# Patient Record
Sex: FEMALE | Race: WHITE | HISPANIC OR LATINO | Employment: FULL TIME | URBAN - METROPOLITAN AREA
[De-identification: names, ages, dates, MRNs, and addresses within clinical notes are randomized per-mention and may not be internally consistent; named-entity substitution may affect disease eponyms.]

---

## 2018-03-12 ENCOUNTER — HOSPITAL ENCOUNTER (INPATIENT)
Facility: MEDICAL CENTER | Age: 28
LOS: 8 days | DRG: 853 | End: 2018-03-20
Attending: EMERGENCY MEDICINE | Admitting: HOSPITALIST
Payer: COMMERCIAL

## 2018-03-12 ENCOUNTER — APPOINTMENT (OUTPATIENT)
Dept: RADIOLOGY | Facility: MEDICAL CENTER | Age: 28
DRG: 853 | End: 2018-03-12
Attending: INTERNAL MEDICINE
Payer: COMMERCIAL

## 2018-03-12 ENCOUNTER — HOSPITAL ENCOUNTER (OUTPATIENT)
Dept: RADIOLOGY | Facility: MEDICAL CENTER | Age: 28
End: 2018-03-12

## 2018-03-12 ENCOUNTER — RESOLUTE PROFESSIONAL BILLING HOSPITAL PROF FEE (OUTPATIENT)
Dept: HOSPITALIST | Facility: MEDICAL CENTER | Age: 28
End: 2018-03-12
Payer: COMMERCIAL

## 2018-03-12 ENCOUNTER — APPOINTMENT (OUTPATIENT)
Dept: RADIOLOGY | Facility: MEDICAL CENTER | Age: 28
DRG: 853 | End: 2018-03-12
Attending: HOSPITALIST
Payer: COMMERCIAL

## 2018-03-12 DIAGNOSIS — K85.00 IDIOPATHIC ACUTE PANCREATITIS WITHOUT INFECTION OR NECROSIS: ICD-10-CM

## 2018-03-12 DIAGNOSIS — K85.10 GALLSTONE PANCREATITIS: Primary | ICD-10-CM

## 2018-03-12 DIAGNOSIS — G89.18 POSTOPERATIVE PAIN: ICD-10-CM

## 2018-03-12 PROBLEM — R10.11 RUQ PAIN: Status: RESOLVED | Noted: 2018-03-12 | Resolved: 2018-03-12

## 2018-03-12 PROBLEM — R10.11 RUQ PAIN: Status: ACTIVE | Noted: 2018-03-12

## 2018-03-12 PROBLEM — A41.9 SEPSIS (HCC): Status: ACTIVE | Noted: 2018-03-12

## 2018-03-12 PROBLEM — K81.9 CHOLECYSTITIS: Status: ACTIVE | Noted: 2018-03-12

## 2018-03-12 PROBLEM — K80.50 CHOLEDOCHOLITHIASIS: Status: ACTIVE | Noted: 2018-03-12

## 2018-03-12 LAB
ALBUMIN SERPL BCP-MCNC: 3.7 G/DL (ref 3.2–4.9)
ALBUMIN/GLOB SERPL: 1.8 G/DL
ALP SERPL-CCNC: 458 U/L (ref 30–99)
ALT SERPL-CCNC: 642 U/L (ref 2–50)
ANION GAP SERPL CALC-SCNC: 9 MMOL/L (ref 0–11.9)
APPEARANCE UR: CLEAR
APTT PPP: 29.6 SEC (ref 24.7–36)
AST SERPL-CCNC: 225 U/L (ref 12–45)
BASOPHILS # BLD AUTO: 0.3 % (ref 0–1.8)
BASOPHILS # BLD: 0.04 K/UL (ref 0–0.12)
BILIRUB SERPL-MCNC: 1.7 MG/DL (ref 0.1–1.5)
BILIRUB UR QL STRIP.AUTO: NEGATIVE
BUN SERPL-MCNC: 11 MG/DL (ref 8–22)
CALCIUM SERPL-MCNC: 7.9 MG/DL (ref 8.5–10.5)
CHLORIDE SERPL-SCNC: 107 MMOL/L (ref 96–112)
CO2 SERPL-SCNC: 23 MMOL/L (ref 20–33)
COLOR UR: ABNORMAL
CREAT SERPL-MCNC: 0.46 MG/DL (ref 0.5–1.4)
EOSINOPHIL # BLD AUTO: 0 K/UL (ref 0–0.51)
EOSINOPHIL NFR BLD: 0 % (ref 0–6.9)
ERYTHROCYTE [DISTWIDTH] IN BLOOD BY AUTOMATED COUNT: 46.8 FL (ref 35.9–50)
GLOBULIN SER CALC-MCNC: 2.1 G/DL (ref 1.9–3.5)
GLUCOSE SERPL-MCNC: 126 MG/DL (ref 65–99)
GLUCOSE UR STRIP.AUTO-MCNC: 100 MG/DL
HCT VFR BLD AUTO: 39 % (ref 37–47)
HGB BLD-MCNC: 12.7 G/DL (ref 12–16)
IMM GRANULOCYTES # BLD AUTO: 0.07 K/UL (ref 0–0.11)
IMM GRANULOCYTES NFR BLD AUTO: 0.5 % (ref 0–0.9)
INR PPP: 1.04 (ref 0.87–1.13)
KETONES UR STRIP.AUTO-MCNC: 80 MG/DL
LACTATE BLD-SCNC: 1.4 MMOL/L (ref 0.5–2)
LEUKOCYTE ESTERASE UR QL STRIP.AUTO: NEGATIVE
LIPASE SERPL-CCNC: >6000 U/L (ref 11–82)
LYMPHOCYTES # BLD AUTO: 0.71 K/UL (ref 1–4.8)
LYMPHOCYTES NFR BLD: 5.6 % (ref 22–41)
MCH RBC QN AUTO: 30.4 PG (ref 27–33)
MCHC RBC AUTO-ENTMCNC: 32.6 G/DL (ref 33.6–35)
MCV RBC AUTO: 93.3 FL (ref 81.4–97.8)
MICRO URNS: ABNORMAL
MONOCYTES # BLD AUTO: 0.44 K/UL (ref 0–0.85)
MONOCYTES NFR BLD AUTO: 3.4 % (ref 0–13.4)
NEUTROPHILS # BLD AUTO: 11.51 K/UL (ref 2–7.15)
NEUTROPHILS NFR BLD: 90.2 % (ref 44–72)
NITRITE UR QL STRIP.AUTO: NEGATIVE
NRBC # BLD AUTO: 0 K/UL
NRBC BLD-RTO: 0 /100 WBC
PH UR STRIP.AUTO: 5.5 [PH]
PLATELET # BLD AUTO: 320 K/UL (ref 164–446)
PMV BLD AUTO: 11.5 FL (ref 9–12.9)
POTASSIUM SERPL-SCNC: 3.6 MMOL/L (ref 3.6–5.5)
PROT SERPL-MCNC: 5.8 G/DL (ref 6–8.2)
PROT UR QL STRIP: NEGATIVE MG/DL
PROTHROMBIN TIME: 13.3 SEC (ref 12–14.6)
RBC # BLD AUTO: 4.18 M/UL (ref 4.2–5.4)
RBC UR QL AUTO: NEGATIVE
SODIUM SERPL-SCNC: 139 MMOL/L (ref 135–145)
SP GR UR STRIP.AUTO: 1.03
TSH SERPL DL<=0.005 MIU/L-ACNC: 1.39 UIU/ML (ref 0.38–5.33)
UROBILINOGEN UR STRIP.AUTO-MCNC: 1 MG/DL
WBC # BLD AUTO: 12.8 K/UL (ref 4.8–10.8)

## 2018-03-12 PROCEDURE — 85025 COMPLETE CBC W/AUTO DIFF WBC: CPT

## 2018-03-12 PROCEDURE — 700111 HCHG RX REV CODE 636 W/ 250 OVERRIDE (IP): Performed by: HOSPITALIST

## 2018-03-12 PROCEDURE — 81003 URINALYSIS AUTO W/O SCOPE: CPT

## 2018-03-12 PROCEDURE — 84443 ASSAY THYROID STIM HORMONE: CPT

## 2018-03-12 PROCEDURE — 85610 PROTHROMBIN TIME: CPT

## 2018-03-12 PROCEDURE — 96374 THER/PROPH/DIAG INJ IV PUSH: CPT

## 2018-03-12 PROCEDURE — 99285 EMERGENCY DEPT VISIT HI MDM: CPT

## 2018-03-12 PROCEDURE — 83690 ASSAY OF LIPASE: CPT

## 2018-03-12 PROCEDURE — 83605 ASSAY OF LACTIC ACID: CPT

## 2018-03-12 PROCEDURE — 36415 COLL VENOUS BLD VENIPUNCTURE: CPT

## 2018-03-12 PROCEDURE — 770006 HCHG ROOM/CARE - MED/SURG/GYN SEMI*

## 2018-03-12 PROCEDURE — 700105 HCHG RX REV CODE 258

## 2018-03-12 PROCEDURE — 700105 HCHG RX REV CODE 258: Performed by: HOSPITALIST

## 2018-03-12 PROCEDURE — 700117 HCHG RX CONTRAST REV CODE 255: Performed by: HOSPITALIST

## 2018-03-12 PROCEDURE — 80053 COMPREHEN METABOLIC PANEL: CPT

## 2018-03-12 PROCEDURE — 99223 1ST HOSP IP/OBS HIGH 75: CPT | Performed by: HOSPITALIST

## 2018-03-12 PROCEDURE — 74177 CT ABD & PELVIS W/CONTRAST: CPT

## 2018-03-12 PROCEDURE — 74181 MRI ABDOMEN W/O CONTRAST: CPT

## 2018-03-12 PROCEDURE — 85730 THROMBOPLASTIN TIME PARTIAL: CPT

## 2018-03-12 PROCEDURE — 83036 HEMOGLOBIN GLYCOSYLATED A1C: CPT

## 2018-03-12 RX ORDER — OXYCODONE HYDROCHLORIDE 5 MG/1
5 TABLET ORAL
Status: DISCONTINUED | OUTPATIENT
Start: 2018-03-12 | End: 2018-03-20 | Stop reason: HOSPADM

## 2018-03-12 RX ORDER — PROMETHAZINE HYDROCHLORIDE 25 MG/1
12.5-25 SUPPOSITORY RECTAL EVERY 4 HOURS PRN
Status: DISCONTINUED | OUTPATIENT
Start: 2018-03-12 | End: 2018-03-20 | Stop reason: HOSPADM

## 2018-03-12 RX ORDER — SODIUM CHLORIDE 9 MG/ML
INJECTION, SOLUTION INTRAVENOUS CONTINUOUS
Status: DISCONTINUED | OUTPATIENT
Start: 2018-03-12 | End: 2018-03-20 | Stop reason: HOSPADM

## 2018-03-12 RX ORDER — ONDANSETRON 2 MG/ML
4 INJECTION INTRAMUSCULAR; INTRAVENOUS EVERY 4 HOURS PRN
Status: DISCONTINUED | OUTPATIENT
Start: 2018-03-12 | End: 2018-03-20 | Stop reason: HOSPADM

## 2018-03-12 RX ORDER — BISACODYL 10 MG
10 SUPPOSITORY, RECTAL RECTAL
Status: DISCONTINUED | OUTPATIENT
Start: 2018-03-12 | End: 2018-03-20 | Stop reason: HOSPADM

## 2018-03-12 RX ORDER — POLYETHYLENE GLYCOL 3350 17 G/17G
1 POWDER, FOR SOLUTION ORAL
Status: DISCONTINUED | OUTPATIENT
Start: 2018-03-12 | End: 2018-03-20 | Stop reason: HOSPADM

## 2018-03-12 RX ORDER — SODIUM CHLORIDE 9 MG/ML
30 INJECTION, SOLUTION INTRAVENOUS
Status: DISCONTINUED | OUTPATIENT
Start: 2018-03-12 | End: 2018-03-20 | Stop reason: HOSPADM

## 2018-03-12 RX ORDER — SODIUM CHLORIDE 9 MG/ML
INJECTION, SOLUTION INTRAVENOUS
Status: COMPLETED
Start: 2018-03-12 | End: 2018-03-12

## 2018-03-12 RX ORDER — AMOXICILLIN 250 MG
2 CAPSULE ORAL 2 TIMES DAILY
Status: DISCONTINUED | OUTPATIENT
Start: 2018-03-12 | End: 2018-03-20 | Stop reason: HOSPADM

## 2018-03-12 RX ORDER — SODIUM CHLORIDE 9 MG/ML
500 INJECTION, SOLUTION INTRAVENOUS
Status: COMPLETED | OUTPATIENT
Start: 2018-03-12 | End: 2018-03-12

## 2018-03-12 RX ORDER — HEPARIN SODIUM 5000 [USP'U]/ML
5000 INJECTION, SOLUTION INTRAVENOUS; SUBCUTANEOUS EVERY 8 HOURS
Status: DISCONTINUED | OUTPATIENT
Start: 2018-03-12 | End: 2018-03-20 | Stop reason: HOSPADM

## 2018-03-12 RX ORDER — OXYCODONE HYDROCHLORIDE 10 MG/1
10 TABLET ORAL
Status: DISCONTINUED | OUTPATIENT
Start: 2018-03-12 | End: 2018-03-20 | Stop reason: HOSPADM

## 2018-03-12 RX ORDER — PROMETHAZINE HYDROCHLORIDE 25 MG/1
12.5-25 TABLET ORAL EVERY 4 HOURS PRN
Status: DISCONTINUED | OUTPATIENT
Start: 2018-03-12 | End: 2018-03-20 | Stop reason: HOSPADM

## 2018-03-12 RX ORDER — ONDANSETRON 4 MG/1
4 TABLET, ORALLY DISINTEGRATING ORAL EVERY 4 HOURS PRN
Status: DISCONTINUED | OUTPATIENT
Start: 2018-03-12 | End: 2018-03-20 | Stop reason: HOSPADM

## 2018-03-12 RX ADMIN — IOHEXOL 100 ML: 350 INJECTION, SOLUTION INTRAVENOUS at 11:34

## 2018-03-12 RX ADMIN — PIPERACILLIN SODIUM AND TAZOBACTAM SODIUM 3.38 G: 3; .375 INJECTION, POWDER, FOR SOLUTION INTRAVENOUS at 04:25

## 2018-03-12 RX ADMIN — ONDANSETRON HYDROCHLORIDE 4 MG: 2 INJECTION, SOLUTION INTRAMUSCULAR; INTRAVENOUS at 16:58

## 2018-03-12 RX ADMIN — SODIUM CHLORIDE: 9 INJECTION, SOLUTION INTRAVENOUS at 04:26

## 2018-03-12 RX ADMIN — PIPERACILLIN SODIUM AND TAZOBACTAM SODIUM 3.38 G: 3; .375 INJECTION, POWDER, FOR SOLUTION INTRAVENOUS at 22:14

## 2018-03-12 RX ADMIN — PIPERACILLIN SODIUM AND TAZOBACTAM SODIUM 3.38 G: 3; .375 INJECTION, POWDER, FOR SOLUTION INTRAVENOUS at 13:57

## 2018-03-12 RX ADMIN — HYDROMORPHONE HYDROCHLORIDE 0.5 MG: 10 INJECTION, SOLUTION INTRAMUSCULAR; INTRAVENOUS; SUBCUTANEOUS at 19:44

## 2018-03-12 RX ADMIN — HEPARIN SODIUM 5000 UNITS: 5000 INJECTION, SOLUTION INTRAVENOUS; SUBCUTANEOUS at 22:14

## 2018-03-12 RX ADMIN — HYDROMORPHONE HYDROCHLORIDE 0.5 MG: 10 INJECTION, SOLUTION INTRAMUSCULAR; INTRAVENOUS; SUBCUTANEOUS at 13:57

## 2018-03-12 RX ADMIN — PIPERACILLIN SODIUM AND TAZOBACTAM SODIUM 3.38 G: 3; .375 INJECTION, POWDER, FOR SOLUTION INTRAVENOUS at 06:25

## 2018-03-12 RX ADMIN — SODIUM CHLORIDE 500 ML: 9 INJECTION, SOLUTION INTRAVENOUS at 22:19

## 2018-03-12 RX ADMIN — HEPARIN SODIUM 5000 UNITS: 5000 INJECTION, SOLUTION INTRAVENOUS; SUBCUTANEOUS at 09:14

## 2018-03-12 RX ADMIN — ONDANSETRON HYDROCHLORIDE 4 MG: 2 INJECTION, SOLUTION INTRAMUSCULAR; INTRAVENOUS at 06:37

## 2018-03-12 RX ADMIN — SODIUM CHLORIDE: 9 INJECTION, SOLUTION INTRAVENOUS at 19:49

## 2018-03-12 RX ADMIN — SODIUM CHLORIDE: 9 INJECTION, SOLUTION INTRAVENOUS at 10:45

## 2018-03-12 RX ADMIN — HYDROMORPHONE HYDROCHLORIDE 0.5 MG: 10 INJECTION, SOLUTION INTRAMUSCULAR; INTRAVENOUS; SUBCUTANEOUS at 09:09

## 2018-03-12 RX ADMIN — HYDROMORPHONE HYDROCHLORIDE 0.5 MG: 10 INJECTION, SOLUTION INTRAMUSCULAR; INTRAVENOUS; SUBCUTANEOUS at 05:42

## 2018-03-12 RX ADMIN — ONDANSETRON HYDROCHLORIDE 4 MG: 2 INJECTION, SOLUTION INTRAMUSCULAR; INTRAVENOUS at 11:03

## 2018-03-12 RX ADMIN — HYDROMORPHONE HYDROCHLORIDE 0.5 MG: 10 INJECTION, SOLUTION INTRAMUSCULAR; INTRAVENOUS; SUBCUTANEOUS at 02:28

## 2018-03-12 ASSESSMENT — ENCOUNTER SYMPTOMS
BLURRED VISION: 0
CHILLS: 0
INSOMNIA: 0
VOMITING: 0
ABDOMINAL PAIN: 1
VOMITING: 1
EYE DISCHARGE: 0
HEARTBURN: 0
NAUSEA: 1
MYALGIAS: 0
NAUSEA: 0
DIZZINESS: 0
HEADACHES: 0
WEAKNESS: 0
FEVER: 0
SENSORY CHANGE: 0
HALLUCINATIONS: 0
EYE PAIN: 0
SPEECH CHANGE: 0
SINUS PAIN: 0
DEPRESSION: 0
SORE THROAT: 0
NECK PAIN: 0
HEMOPTYSIS: 0
TINGLING: 0
PALPITATIONS: 0
BRUISES/BLEEDS EASILY: 0
BACK PAIN: 0
FOCAL WEAKNESS: 0
COUGH: 0
FLANK PAIN: 0
SHORTNESS OF BREATH: 0
DOUBLE VISION: 0

## 2018-03-12 ASSESSMENT — PAIN SCALES - GENERAL
PAINLEVEL_OUTOF10: 4
PAINLEVEL_OUTOF10: 6
PAINLEVEL_OUTOF10: 5
PAINLEVEL_OUTOF10: 2
PAINLEVEL_OUTOF10: 1
PAINLEVEL_OUTOF10: 2
PAINLEVEL_OUTOF10: 3
PAINLEVEL_OUTOF10: 5

## 2018-03-12 ASSESSMENT — PATIENT HEALTH QUESTIONNAIRE - PHQ9
SUM OF ALL RESPONSES TO PHQ9 QUESTIONS 1 AND 2: 0
2. FEELING DOWN, DEPRESSED, IRRITABLE, OR HOPELESS: NOT AT ALL
1. LITTLE INTEREST OR PLEASURE IN DOING THINGS: NOT AT ALL
SUM OF ALL RESPONSES TO PHQ QUESTIONS 1-9: 0

## 2018-03-12 ASSESSMENT — LIFESTYLE VARIABLES
EVER_SMOKED: NEVER
ALCOHOL_USE: NO
SUBSTANCE_ABUSE: 0

## 2018-03-12 NOTE — ASSESSMENT & PLAN NOTE
Noted on outside ultrasound.   With common bile duct enlargement   MRCP also positive for choledocholithiasis   Had ERCP done 3/14/18

## 2018-03-12 NOTE — PROGRESS NOTES
Report received from NOC shift RN.   A/O X 4. Room air.   VSS. Labs reviewed.   WBC 12.8; Cr at 0.46; Ca at 7.9; AST at 225; ALT at 642; Alk Phos at 458 and Lipase >6000.   BS normoactive X 4. Patient reported some abdominal pain during auscultation / palpation 4/10.   +BM, last reported yesterday morning. -flatus. +void.  Patient ambulating independently.   PIV from outside facilit present on right PIV running  NS @ 125mL/hr and Zosyn.   Call light at bedside.

## 2018-03-12 NOTE — ED TRIAGE NOTES
Chief Complaint   Patient presents with   • Gallstones     PT BIB EMS from [facility] ED in [place]. Pt c/o 5/10 pain in URQ ABD. Pt dx with gallstones 3 wks ago. Per pt she had a doctors apt on Thursday where the MD told her she had H pylori which needed to be tx prior to gallstone surgery. Today she could not stop vomiting and pain was a 10/10 so she went to ED.      Pt given 4L NS, 18mg  Morphine, 4 zofran, 10 reglan at previous hospital.    In route pt VSS HR 98 , /78 , Oxygen 98% RA    On arrival pt is A&Ox4. Denies SOB, chest pain, fever/chills.

## 2018-03-12 NOTE — PROGRESS NOTES
Patient alert and oriented x 4. Patient arrived on unit via gurney. Ambulated to bed from [facility] in [place]. 2 RNs skin assessment completed. Skin intact. No bruising or abrasions noted. Bed in lowest position.

## 2018-03-12 NOTE — H&P
Hospital Medicine History and Physical    Date of Service  3/12/2018    Chief Complaint  Chief Complaint   Patient presents with   • Gallstones       History of Presenting Illness  27 y.o. female who presented 3/12/2018 with right upper quadrant pain. Patient sent from outlying facility secondary to increasing abdominal pain over the last few days. Has had nausea and vomiting significant throughout the day today. She has never had pancreatitis in the past. She denies any alcohol use currently. She had elevated lipase of 14,000 hospital this year. She had increased liver function tests and ultrasound showing a dilated duct. Right upper quadrant pain radiates to the back. She denies any fevers chills sweats at this time. No alleviating or exacerbating factors.    Ultrasound of gallbladder shows cholelithiasis with dilated common bile duct findings may represent choledocholithiasis are recently passed common duct stone with residual common duct enlargement. So with leukocytosis 17.1 with 4% bands. [place] also demonstrates abnormal LFTs with associated lipase of 14,000.     Primary Care Physician  No primary care provider on file.    Consultants  None    Code Status  full    Review of Systems  Review of Systems   Constitutional: Negative for chills and fever.   HENT: Negative for congestion, hearing loss, sinus pain and tinnitus.    Eyes: Negative for blurred vision, double vision and discharge.   Respiratory: Negative for cough, hemoptysis and shortness of breath.    Cardiovascular: Negative for chest pain, palpitations and leg swelling.   Gastrointestinal: Positive for abdominal pain, nausea and vomiting. Negative for heartburn.   Genitourinary: Negative for dysuria and flank pain.   Musculoskeletal: Negative for joint pain and myalgias.   Skin: Negative for rash.   Neurological: Negative for dizziness, sensory change, speech change, focal weakness and weakness.   Endo/Heme/Allergies: Negative for environmental  allergies. Does not bruise/bleed easily.   Psychiatric/Behavioral: Negative for depression, hallucinations and substance abuse.        Past Medical History  Denies any previous medical hx    Surgical History  Denies any past surgical hx    Medications  No current facility-administered medications on file prior to encounter.      No current outpatient prescriptions on file prior to encounter.       Family History  History reviewed. No pertinent family history.    Social History  Social History   Substance Use Topics   • Smoking status: Never Smoker   • Smokeless tobacco: Never Used   • Alcohol use No       Allergies  No Known Allergies     Physical Exam  Laboratory   Hemodynamics  No data recorded.      No Data Recorded           Respiratory                    Physical Exam   Constitutional: She is oriented to person, place, and time. She appears well-developed and well-nourished.   HENT:   Head: Normocephalic and atraumatic.   Eyes: Conjunctivae and EOM are normal. Pupils are equal, round, and reactive to light.   Neck: Normal range of motion. Neck supple. No JVD present.   Cardiovascular: Normal rate, regular rhythm, normal heart sounds and intact distal pulses.    No murmur heard.  Pulmonary/Chest: Effort normal and breath sounds normal. No respiratory distress.   Abdominal: Soft. Bowel sounds are normal. There is no rebound and no guarding.   Epigastric/RUQ ttp    Musculoskeletal: Normal range of motion. She exhibits no edema.   Neurological: She is alert and oriented to person, place, and time. She exhibits normal muscle tone.   Skin: Skin is warm and dry.   Psychiatric: She has a normal mood and affect. Her behavior is normal. Judgment and thought content normal.   Nursing note and vitals reviewed.              No results for input(s): ALTSGPT, ASTSGOT, ALKPHOSPHAT, TBILIRUBIN, DBILIRUBIN, GAMMAGT, AMYLASE, LIPASE, ALB, PREALBUMIN, GLUCOSE in the last 72 hours.              No results found for:  TROPONINI  Urinalysis:  No results found for: SPECGRAVITY, GLUCOSEUR, KETONES, NITRITE, WBCURINE, RBCURINE, BACTERIA, EPITHELCELL     Imaging  reviewed   Assessment/Plan     I anticipate this patient will require at least two midnights for appropriate medical management, necessitating inpatient admission.    * Gallstone pancreatitis   Assessment & Plan    IVF   Anti emetics  Pain control   Needs GI/Gen surg consult in am         Sepsis (CMS-[facility])   Assessment & Plan    This is sepsis (without associated acute organ dysfunction).   intrabd source  IVF   IV abx for cholelithiasis for now   Cultures  Lactic  descilate as clinically appropriate         Choledocholithiasis   Assessment & Plan    Likely noted on OSH imaging   With common bile duct enlargement   NPO   Pain control   IVF   Anti emetics  Needs GI/Gen surg consultation             VTE prophylaxis: SCD

## 2018-03-12 NOTE — ASSESSMENT & PLAN NOTE
This is sepsis (without associated acute organ dysfunction).   2/2 cholecystitis likely  Cont on IVF and IV abx

## 2018-03-12 NOTE — ED PROVIDER NOTES
ED Provider Note    CHIEF COMPLAINT  Chief Complaint   Patient presents with   • Gallstones       HPI  Nisha Keating is a 27 y.o. female here for evaluation of pancreatitis and abdominal pain. Patient was sent from outlying facility secondary to increasing abdominal pain over the last few days, with some associated nausea. She has no chest pain, no shortness of breath. She has no back pain. There is no history of the same. It was noted that on her evaluation in [place], that she had an elevated lipase of over 14,000, with increased liver function tests and an ultrasound showing a dilated duct.    PAST MEDICAL HISTORY    none    SOCIAL HISTORY  Social History     Social History Main Topics   • Smoking status: Never Smoker   • Smokeless tobacco: Never Used   • Alcohol use No   • Drug use: No   • Sexual activity: Not on file       SURGICAL HISTORY  patient denies any surgical history    CURRENT MEDICATIONS  Home Medications     Reviewed by Leslie Mari R.N. (Registered Nurse) on 03/12/18 at 0213  Med List Status: Complete   Medication Last Dose Status        Patient Noam Taking any Medications                       ALLERGIES  No Known Allergies    REVIEW OF SYSTEMS  See HPI for further details. Review of systems as above, otherwise all other systems are negative.     PHYSICAL EXAM  Constitutional: Well developed, well nourished. Mild acute distress.  HEENT: Normocephalic, atraumatic. Posterior pharynx clear and moist.  Eyes:  EOMI. Normal sclera.  Neck: Supple, Full range of motion, nontender.  Chest/Pulmonary: clear to ausculation. Symmetrical expansion.   Cardio: Regular rate and rhythm with no murmur.   Abdomen: Soft, mild epigastric,  No peritoneal signs. No guarding. No palpable masses.  Musculoskeletal: No deformity, no edema, neurovascular intact.   Neuro: Clear speech, appropriate, cooperative, cranial nerves II-XII grossly intact.  Psych: Normal mood and affect    PROCEDURES     MEDICAL RECORD  I  have reviewed patient's medical record and pertinent results are listed.    All labs from [place] reviewed. LFTs are elevated, as well as elevated lipase and WBC count. Ultrasound shows cholelithiasis with questionable choledocholithiasis.    COURSE & MEDICAL DECISION MAKING  I have reviewed any medical record information, laboratory studies and radiographic results as noted above.    2:19 AM  The pt is comfortable, nontoxic appearing, and will be admitted to the hospitalist service. Dr. Moeller will admit the pt, and likely consult GI in the am.     FINAL IMPRESSION  1. Idiopathic acute pancreatitis without infection or necrosis          Electronically signed by: Eduardo Rainey, 3/12/2018 2:17 AM

## 2018-03-12 NOTE — CONSULTS
General Surgery Consult    Date of Service: 3/12/2018    Consulting Physician: Trenton Becker M.D. [facility]    -------------------------------------------------------------------------------------------------    Chief complaint: abdominal pain    HPI: This is a 27 y.o. female with abdominal pain and findings of gallstone pancreatitis.  Pain is moderate at this time. Never had this before. Some nausea, no vomiting.      History reviewed. No pertinent past medical history.    History reviewed. No pertinent surgical history.    Current Facility-Administered Medications   Medication Dose Route Frequency Provider Last Rate Last Dose   • senna-docusate (PERICOLACE or SENOKOT S) 8.6-50 MG per tablet 2 Tab  2 Tab Oral BID Roverto Moeller M.D.   Stopped at 03/12/18 0900    And   • polyethylene glycol/lytes (MIRALAX) PACKET 1 Packet  1 Packet Oral QDAY PRN Roverto Moeller M.D.        And   • magnesium hydroxide (MILK OF MAGNESIA) suspension 30 mL  30 mL Oral QDAY PRVIOLA Moeller M.D.        And   • bisacodyl (DULCOLAX) suppository 10 mg  10 mg Rectal QDAY PRVIOLA Moeller M.D.       • NS infusion   Intravenous Continuous Roverto Moeller M.D. 175 mL/hr at 03/12/18 0426     • ondansetron (ZOFRAN) syringe/vial injection 4 mg  4 mg Intravenous Q4HRS PRVIOLA Moeller M.D.   4 mg at 03/12/18 0637   • ondansetron (ZOFRAN ODT) dispertab 4 mg  4 mg Oral Q4HRS PRVIOLA Moeller M.D.       • promethazine (PHENERGAN) tablet 12.5-25 mg  12.5-25 mg Oral Q4HRS MIREILLE Moeller M.D.       • promethazine (PHENERGAN) suppository 12.5-25 mg  12.5-25 mg Rectal Q4HRS PRN Roverto Moeller M.D.       • prochlorperazine (COMPAZINE) injection 5-10 mg  5-10 mg Intravenous Q4HRS PRN Roverto Moeller M.D.       • Pharmacy Consult Request ...Pain Management Review   Other PRN Roverto Moeller M.D.        And   • oxyCODONE immediate-release (ROXICODONE) tablet 5 mg  5 mg Oral Q3HRS PRN Roverto Moeller,  M.D.        And   • oxyCODONE immediate release (ROXICODONE) tablet 10 mg  10 mg Oral Q3HRS PRN Roverto Moeller M.D.        And   • HYDROmorphone (DILAUDID) injection 0.5 mg  0.5 mg Intravenous Q3HRS PRN Roverto Moeller M.D.   0.5 mg at 03/12/18 0909   • NS infusion 1,959 mL  30 mL/kg Intravenous Once PRN Roverto Moeller M.D.       • piperacillin-tazobactam (ZOSYN) 3.375 g in  mL IVPB  3.375 g Intravenous Q8HRS Roverto Moeller M.D. 25 mL/hr at 03/12/18 0625 3.375 g at 03/12/18 0625   • heparin injection 5,000 Units  5,000 Units Subcutaneous Q8HRS Josh Slater M.D.   5,000 Units at 03/12/18 0914       Social History     Social History   • Marital status: Single     Spouse name: N/A   • Number of children: N/A   • Years of education: N/A     Occupational History   • Not on file.     Social History Main Topics   • Smoking status: Never Smoker   • Smokeless tobacco: Never Used   • Alcohol use No   • Drug use: No   • Sexual activity: Not on file     Other Topics Concern   • Not on file     Social History Narrative   • No narrative on file       History reviewed. No pertinent family history.    Allergies:  Patient has no known allergies.    Review of Systems:  Constitutional: Negative for fever, chills, weight loss,   HENT:   Negative for hearing loss or tinnitus    Eyes:    Negative for blurred vision, double vision, or loss of vision  Respiratory:  Negative for cough, hemoptysis, or wheezing    Cardiac:  Negative for chest pain or palpitations or orthopnea  Vascular:  Negative for claudication or rest pain   Gastrointestinal: As per HPI   Genitourinary: Negative for dysuria, frequency, or hematuria   Musculoskeletal: Negative for myalgias, back pain, or joint pain  Skin:   Negative for itching or rash  Neurological:  Negative for dizziness, headaches, or tremors     Negative for speech disturbance     Negative for extremity weakness or paresthesias  Endo/Heme:  Negative for easy bruising or  "bleeding  Psychiatric:  Negative for depression, suicidal ideas, or hallucinations    Physical Exam:  Blood pressure 120/82, pulse 82, temperature 36.7 °C (98.1 °F), resp. rate 16, height 1.549 m (5' 1\"), weight 65.3 kg (144 lb), SpO2 95 %.    Constitutional: Alert, oriented, no acute distress  HEENT:  Normocephalic and atraumatic, EOMI  Neck:   Supple, no JVD,   Cardiovascular: Regular rate and rhythm,   Pulmonary:  Good air entry bilaterally,   Abdominal:  Soft,  Mildly distended     Mildly tender to palpation in bilateral upper quadrants     No rebound/guarding  Musculoskeletal: No edema, no tenderness  Neurological:  CN II-XII grossly intact, no focal deficits  Skin:   Skin is warm and dry. No rash noted.  Psychiatric:  Normal mood and affect.    Labs:  Recent Labs      03/12/18 0221   WBC  12.8*   RBC  4.18*   HEMOGLOBIN  12.7   HEMATOCRIT  39.0   MCV  93.3   MCH  30.4   MCHC  32.6*   RDW  46.8   PLATELETCT  320   MPV  11.5     Recent Labs      03/12/18 0221   SODIUM  139   POTASSIUM  3.6   CHLORIDE  107   CO2  23   GLUCOSE  126*   BUN  11   CREATININE  0.46*   CALCIUM  7.9*     Recent Labs      03/12/18 0221   APTT  29.6   INR  1.04     Recent Labs      03/12/18 0221   ASTSGOT  225*   ALTSGPT  642*   TBILIRUBIN  1.7*   ALKPHOSPHAT  458*   GLOBULIN  2.1   INR  1.04       Radiology:  CT: pancreatitis and CBD distention    Assessment: This is a 27 y.o. female with gallstone pancreatitis.    Plan:  NPO except medications, IVF  Trend labs. If obstruction persists will need duct decompression (ERCP)  If improves spontaneously will consider lap alexandra with IOC.    Following along.      Trenton Becker M.D.  [facility]  [phone]  Cell: [phone]    "

## 2018-03-12 NOTE — PROGRESS NOTES
No home medications confirmed by interview with patient at bedside  No abx in the past 30 days  NKDA confirmed

## 2018-03-12 NOTE — PROGRESS NOTES
Surgery    Formal consult to follow    Gallstone pancreatitis.  Trend labs.  If labs improve will schedule lap alexandra with IOC in next 1-2 days.  If labs don't improve then will need ERCP or PTC prior to cholecystectomy.    Trenton Becker MD  General&Vascular Surgery  [facility]  Cell: [phone]  Office: [phone]

## 2018-03-12 NOTE — PROGRESS NOTES
[facility] Hospitalist Progress Note    Date of Service: 3/12/2018    Chief Complaint  27 y.o. female admitted 3/12/2018 with abodminal pain found to have gallstone pancreatitis.     Interval Problem Update  Still with pain since last night    Consultants/Specialty  Contacted GI and general surgery for consultation today.     Disposition  hospital    Ultrasound of gallbladder shows cholelithiasis with dilated common bile duct findings may represent choledocholithiasis are recently passed common duct stone with residual common duct enlargement.        Review of Systems   Constitutional: Negative for chills and fever.   HENT: Negative for sore throat.    Eyes: Negative for blurred vision and pain.   Respiratory: Negative for cough and shortness of breath.    Cardiovascular: Negative for chest pain and palpitations.   Gastrointestinal: Positive for abdominal pain. Negative for nausea and vomiting.   Genitourinary: Negative for dysuria and urgency.   Musculoskeletal: Negative for back pain and neck pain.   Skin: Negative for itching and rash.   Neurological: Negative for dizziness, tingling and headaches.   Psychiatric/Behavioral: Negative for depression. The patient does not have insomnia.    All other systems reviewed and are negative.     Physical Exam  Laboratory/Imaging   Hemodynamics  Temp (24hrs), Av.6 °C (97.9 °F), Min:36.5 °C (97.7 °F), Max:36.7 °C (98.1 °F)   Temperature: 36.5 °C (97.7 °F)  Pulse  Av.7  Min: 64  Max: 98 Heart Rate (Monitored): 76  Blood Pressure: 138/87, NIBP: 120/77      Respiratory      Respiration: 17, Pulse Oximetry: 100 %             Fluids  No intake or output data in the 24 hours ending 18 0750    Nutrition  Orders Placed This Encounter   Procedures   • Diet NPO     Standing Status:   Standing     Number of Occurrences:   1     Order Specific Question:   Restrict to:     Answer:   Strict [1]     Physical Exam   Constitutional: She is oriented to person, place, and time. She  appears well-developed and well-nourished. No distress.   HENT:   Right Ear: External ear normal.   Left Ear: External ear normal.   Nose: Nose normal.   Eyes: Conjunctivae are normal. Right eye exhibits no discharge. Left eye exhibits no discharge.   Neck: No JVD present.   Cardiovascular: Regular rhythm and normal heart sounds.    No murmur heard.  Cap refill 2sec  Pulses 2+ throughout  Normal skin  Color.    Pulmonary/Chest: Effort normal and breath sounds normal. No stridor. No respiratory distress. She has no wheezes. She has no rales.   Abdominal: Soft. Bowel sounds are normal. She exhibits no distension. There is tenderness.   Musculoskeletal: She exhibits no edema or tenderness.   Neurological: She is alert and oriented to person, place, and time.   Skin: Skin is warm and dry. She is not diaphoretic. No erythema.   Psychiatric: She has a normal mood and affect. Her behavior is normal.   Nursing note and vitals reviewed.      Recent Labs      03/12/18 0221   WBC  12.8*   RBC  4.18*   HEMOGLOBIN  12.7   HEMATOCRIT  39.0   MCV  93.3   MCH  30.4   MCHC  32.6*   RDW  46.8   PLATELETCT  320   MPV  11.5     Recent Labs      03/12/18 0221   SODIUM  139   POTASSIUM  3.6   CHLORIDE  107   CO2  23   GLUCOSE  126*   BUN  11   CREATININE  0.46*   CALCIUM  7.9*     Recent Labs      03/12/18 0221   APTT  29.6   INR  1.04                  Assessment/Plan     * Gallstone pancreatitis   Assessment & Plan    IVF   Anti emetics  Pain control   eval with ct for necrosis          Cholecystitis   Assessment & Plan    Presumed. Surgery consulted. GI for possible ercp.   Iv abx  Pain control iv fluids  Npo          Sepsis (CMS-[facility])   Assessment & Plan    This is sepsis (without associated acute organ dysfunction).   2/2 cholecystitis likely  IVF   IV abx   Cultures pending  Lactic acid ok at 1.4          Choledocholithiasis   Assessment & Plan    Noted on outside ultrasound.   With common bile duct enlargement c/w stone or  passed stone  NPO   Pain control   IVF   Anti emetics  Needs GI/Gen surg consultation   Gi consulted today.           Quality-Core Measures   Reviewed items::  EKG reviewed, Radiology images reviewed, Labs reviewed and Medications reviewed  Leon catheter::  No Leon  DVT prophylaxis pharmacological::  Heparin  DVT prophylaxis - mechanical:  SCDs  Ulcer Prophylaxis::  Yes  Antibiotics:  Treating active infection/contamination beyond 24 hours perioperative coverage  Assessed for rehabilitation services:  Patient was assess for and/or received rehabilitation services during this hospitalization

## 2018-03-12 NOTE — ASSESSMENT & PLAN NOTE
IVF   Anti emetics  Pain control   Had ERCP done on 3/14/18 , gi following appreciate rec.   Had cholecystectomy done on 3/15/18 , surgery following bahman rec.

## 2018-03-13 ENCOUNTER — APPOINTMENT (OUTPATIENT)
Dept: RADIOLOGY | Facility: MEDICAL CENTER | Age: 28
DRG: 853 | End: 2018-03-13
Attending: INTERNAL MEDICINE
Payer: COMMERCIAL

## 2018-03-13 LAB
ALBUMIN SERPL BCP-MCNC: 3.5 G/DL (ref 3.2–4.9)
ALBUMIN/GLOB SERPL: 1.6 G/DL
ALP SERPL-CCNC: 473 U/L (ref 30–99)
ALT SERPL-CCNC: 486 U/L (ref 2–50)
ANION GAP SERPL CALC-SCNC: 8 MMOL/L (ref 0–11.9)
AST SERPL-CCNC: 148 U/L (ref 12–45)
B-HCG FREE SERPL-ACNC: <5 MIU/ML
BASOPHILS # BLD AUTO: 0.4 % (ref 0–1.8)
BASOPHILS # BLD: 0.05 K/UL (ref 0–0.12)
BILIRUB SERPL-MCNC: 2.5 MG/DL (ref 0.1–1.5)
BUN SERPL-MCNC: 6 MG/DL (ref 8–22)
CALCIUM SERPL-MCNC: 8 MG/DL (ref 8.5–10.5)
CHLORIDE SERPL-SCNC: 104 MMOL/L (ref 96–112)
CHOLEST SERPL-MCNC: 169 MG/DL (ref 100–199)
CO2 SERPL-SCNC: 22 MMOL/L (ref 20–33)
CREAT SERPL-MCNC: 0.39 MG/DL (ref 0.5–1.4)
EOSINOPHIL # BLD AUTO: 0.05 K/UL (ref 0–0.51)
EOSINOPHIL NFR BLD: 0.4 % (ref 0–6.9)
ERYTHROCYTE [DISTWIDTH] IN BLOOD BY AUTOMATED COUNT: 47.3 FL (ref 35.9–50)
EST. AVERAGE GLUCOSE BLD GHB EST-MCNC: 108 MG/DL
GLOBULIN SER CALC-MCNC: 2.2 G/DL (ref 1.9–3.5)
GLUCOSE SERPL-MCNC: 66 MG/DL (ref 65–99)
HBA1C MFR BLD: 5.4 % (ref 0–5.6)
HCT VFR BLD AUTO: 40.7 % (ref 37–47)
HDLC SERPL-MCNC: 42 MG/DL
HGB BLD-MCNC: 12.6 G/DL (ref 12–16)
IHCGL IHCGL: NEGATIVE MIU/ML
IMM GRANULOCYTES # BLD AUTO: 0.05 K/UL (ref 0–0.11)
IMM GRANULOCYTES NFR BLD AUTO: 0.4 % (ref 0–0.9)
LDLC SERPL CALC-MCNC: 98 MG/DL
LIPASE SERPL-CCNC: 2944 U/L (ref 11–82)
LYMPHOCYTES # BLD AUTO: 1.44 K/UL (ref 1–4.8)
LYMPHOCYTES NFR BLD: 12 % (ref 22–41)
MCH RBC QN AUTO: 29.1 PG (ref 27–33)
MCHC RBC AUTO-ENTMCNC: 31 G/DL (ref 33.6–35)
MCV RBC AUTO: 94 FL (ref 81.4–97.8)
MONOCYTES # BLD AUTO: 0.77 K/UL (ref 0–0.85)
MONOCYTES NFR BLD AUTO: 6.4 % (ref 0–13.4)
NEUTROPHILS # BLD AUTO: 9.61 K/UL (ref 2–7.15)
NEUTROPHILS NFR BLD: 80.4 % (ref 44–72)
NRBC # BLD AUTO: 0 K/UL
NRBC BLD-RTO: 0 /100 WBC
PLATELET # BLD AUTO: 297 K/UL (ref 164–446)
PMV BLD AUTO: 11.5 FL (ref 9–12.9)
POTASSIUM SERPL-SCNC: 3.2 MMOL/L (ref 3.6–5.5)
PROT SERPL-MCNC: 5.7 G/DL (ref 6–8.2)
RBC # BLD AUTO: 4.33 M/UL (ref 4.2–5.4)
SODIUM SERPL-SCNC: 134 MMOL/L (ref 135–145)
TRIGL SERPL-MCNC: 143 MG/DL (ref 0–149)
WBC # BLD AUTO: 12 K/UL (ref 4.8–10.8)

## 2018-03-13 PROCEDURE — 84702 CHORIONIC GONADOTROPIN TEST: CPT

## 2018-03-13 PROCEDURE — BF131ZZ FLUOROSCOPY OF GALLBLADDER AND BILE DUCTS USING LOW OSMOLAR CONTRAST: ICD-10-PCS | Performed by: INTERNAL MEDICINE

## 2018-03-13 PROCEDURE — 700111 HCHG RX REV CODE 636 W/ 250 OVERRIDE (IP): Performed by: HOSPITALIST

## 2018-03-13 PROCEDURE — 700101 HCHG RX REV CODE 250

## 2018-03-13 PROCEDURE — 700111 HCHG RX REV CODE 636 W/ 250 OVERRIDE (IP)

## 2018-03-13 PROCEDURE — 700105 HCHG RX REV CODE 258: Performed by: HOSPITALIST

## 2018-03-13 PROCEDURE — 80053 COMPREHEN METABOLIC PANEL: CPT

## 2018-03-13 PROCEDURE — 99232 SBSQ HOSP IP/OBS MODERATE 35: CPT | Performed by: INTERNAL MEDICINE

## 2018-03-13 PROCEDURE — 160035 HCHG PACU - 1ST 60 MINS PHASE I: Performed by: INTERNAL MEDICINE

## 2018-03-13 PROCEDURE — 80061 LIPID PANEL: CPT

## 2018-03-13 PROCEDURE — 700102 HCHG RX REV CODE 250 W/ 637 OVERRIDE(OP): Performed by: HOSPITALIST

## 2018-03-13 PROCEDURE — 160203 HCHG ENDO MINUTES - 1ST 30 MINS LEVEL 4: Performed by: INTERNAL MEDICINE

## 2018-03-13 PROCEDURE — 770006 HCHG ROOM/CARE - MED/SURG/GYN SEMI*

## 2018-03-13 PROCEDURE — 160002 HCHG RECOVERY MINUTES (STAT): Performed by: INTERNAL MEDICINE

## 2018-03-13 PROCEDURE — 160009 HCHG ANES TIME/MIN: Performed by: INTERNAL MEDICINE

## 2018-03-13 PROCEDURE — 110371 HCHG SHELL REV 272: Performed by: INTERNAL MEDICINE

## 2018-03-13 PROCEDURE — 160048 HCHG OR STATISTICAL LEVEL 1-5: Performed by: INTERNAL MEDICINE

## 2018-03-13 PROCEDURE — 85025 COMPLETE CBC W/AUTO DIFF WBC: CPT

## 2018-03-13 PROCEDURE — 0FC98ZZ EXTIRPATION OF MATTER FROM COMMON BILE DUCT, VIA NATURAL OR ARTIFICIAL OPENING ENDOSCOPIC: ICD-10-PCS | Performed by: INTERNAL MEDICINE

## 2018-03-13 PROCEDURE — 83690 ASSAY OF LIPASE: CPT

## 2018-03-13 PROCEDURE — 160208 HCHG ENDO MINUTES - EA ADDL 1 MIN LEVEL 4: Performed by: INTERNAL MEDICINE

## 2018-03-13 PROCEDURE — 74328 X-RAY BILE DUCT ENDOSCOPY: CPT

## 2018-03-13 PROCEDURE — 500066 HCHG BITE BLOCK, ECT: Performed by: INTERNAL MEDICINE

## 2018-03-13 PROCEDURE — A9270 NON-COVERED ITEM OR SERVICE: HCPCS | Performed by: HOSPITALIST

## 2018-03-13 PROCEDURE — 36415 COLL VENOUS BLD VENIPUNCTURE: CPT

## 2018-03-13 RX ADMIN — SODIUM CHLORIDE: 9 INJECTION, SOLUTION INTRAVENOUS at 08:25

## 2018-03-13 RX ADMIN — OXYCODONE HYDROCHLORIDE 10 MG: 10 TABLET ORAL at 20:46

## 2018-03-13 RX ADMIN — HYDROMORPHONE HYDROCHLORIDE 0.5 MG: 10 INJECTION, SOLUTION INTRAMUSCULAR; INTRAVENOUS; SUBCUTANEOUS at 09:27

## 2018-03-13 RX ADMIN — PIPERACILLIN SODIUM AND TAZOBACTAM SODIUM 3.38 G: 3; .375 INJECTION, POWDER, FOR SOLUTION INTRAVENOUS at 15:57

## 2018-03-13 RX ADMIN — PIPERACILLIN SODIUM AND TAZOBACTAM SODIUM 3.38 G: 3; .375 INJECTION, POWDER, FOR SOLUTION INTRAVENOUS at 20:46

## 2018-03-13 RX ADMIN — OXYCODONE HYDROCHLORIDE 5 MG: 5 TABLET ORAL at 16:53

## 2018-03-13 RX ADMIN — HEPARIN SODIUM 5000 UNITS: 5000 INJECTION, SOLUTION INTRAVENOUS; SUBCUTANEOUS at 05:27

## 2018-03-13 RX ADMIN — HYDROMORPHONE HYDROCHLORIDE 0.5 MG: 10 INJECTION, SOLUTION INTRAMUSCULAR; INTRAVENOUS; SUBCUTANEOUS at 05:27

## 2018-03-13 RX ADMIN — SODIUM CHLORIDE: 9 INJECTION, SOLUTION INTRAVENOUS at 20:51

## 2018-03-13 RX ADMIN — STANDARDIZED SENNA CONCENTRATE AND DOCUSATE SODIUM 2 TABLET: 8.6; 5 TABLET, FILM COATED ORAL at 20:46

## 2018-03-13 RX ADMIN — SODIUM CHLORIDE: 9 INJECTION, SOLUTION INTRAVENOUS at 02:37

## 2018-03-13 RX ADMIN — HYDROMORPHONE HYDROCHLORIDE 0.5 MG: 10 INJECTION, SOLUTION INTRAMUSCULAR; INTRAVENOUS; SUBCUTANEOUS at 00:27

## 2018-03-13 RX ADMIN — PIPERACILLIN SODIUM AND TAZOBACTAM SODIUM 3.38 G: 3; .375 INJECTION, POWDER, FOR SOLUTION INTRAVENOUS at 05:27

## 2018-03-13 RX ADMIN — ONDANSETRON HYDROCHLORIDE 4 MG: 2 INJECTION, SOLUTION INTRAMUSCULAR; INTRAVENOUS at 00:25

## 2018-03-13 ASSESSMENT — LIFESTYLE VARIABLES: DO YOU DRINK ALCOHOL: NO

## 2018-03-13 ASSESSMENT — PAIN SCALES - GENERAL
PAINLEVEL_OUTOF10: 3
PAINLEVEL_OUTOF10: 3
PAINLEVEL_OUTOF10: 2
PAINLEVEL_OUTOF10: 5
PAINLEVEL_OUTOF10: 4
PAINLEVEL_OUTOF10: 0
PAINLEVEL_OUTOF10: 6
PAINLEVEL_OUTOF10: 4
PAINLEVEL_OUTOF10: 7

## 2018-03-13 ASSESSMENT — ENCOUNTER SYMPTOMS
SORE THROAT: 0
COUGH: 0
NECK PAIN: 0
FEVER: 0
INSOMNIA: 0
EYE PAIN: 0
BACK PAIN: 0
PALPITATIONS: 0
CHILLS: 0
DEPRESSION: 0
NAUSEA: 0
TINGLING: 0
DIZZINESS: 0
VOMITING: 0
HEADACHES: 0
ABDOMINAL PAIN: 1
BLURRED VISION: 0
SHORTNESS OF BREATH: 0

## 2018-03-13 NOTE — PROGRESS NOTES
Gastroenterology Progress Note    Date of Service: 3/13/2018    Subjective: Pt reports mild improvement in pain overnight. MRCP reveals choledocholithiasis      Objective:    Vitals -   Vitals:    03/12/18 2000 03/12/18 2345 03/13/18 0315 03/13/18 0735   BP: 115/70 113/74 110/72 105/67   Pulse: 88 94 97 (!) 104   Resp: 18 18 18 18   Temp: 37.1 °C (98.8 °F) 37 °C (98.6 °F) 36.7 °C (98.1 °F) 37.1 °C (98.7 °F)   SpO2: 96% 95% 94% 93%   Weight:       Height:           Gen: AAOx3, NAD  HEENT: anicteric, Nares patent, MMM  CVS: regular rhythm, normal rate  Pulm: CTAB, no wheezes  Abd: soft diffusely TTP, Nd, normal bowel sounds  Ext: no edema    Labs:  Lab Results   Component Value Date/Time    SODIUM 134 (L) 03/13/2018 04:30 AM    POTASSIUM 3.2 (L) 03/13/2018 04:30 AM    CHLORIDE 104 03/13/2018 04:30 AM    CO2 22 03/13/2018 04:30 AM    GLUCOSE 66 03/13/2018 04:30 AM    BUN 6 (L) 03/13/2018 04:30 AM    CREATININE 0.39 (L) 03/13/2018 04:30 AM      Lab Results   Component Value Date/Time    WBC 12.0 (H) 03/13/2018 04:30 AM    RBC 4.33 03/13/2018 04:30 AM    HEMOGLOBIN 12.6 03/13/2018 04:30 AM    HEMATOCRIT 40.7 03/13/2018 04:30 AM    MCV 94.0 03/13/2018 04:30 AM    MCH 29.1 03/13/2018 04:30 AM    MCHC 31.0 (L) 03/13/2018 04:30 AM    MPV 11.5 03/13/2018 04:30 AM    NEUTSPOLYS 80.40 (H) 03/13/2018 04:30 AM    LYMPHOCYTES 12.00 (L) 03/13/2018 04:30 AM    MONOCYTES 6.40 03/13/2018 04:30 AM    EOSINOPHILS 0.40 03/13/2018 04:30 AM    BASOPHILS 0.40 03/13/2018 04:30 AM      Lab Results   Component Value Date/Time    PROTHROMBTM 13.3 03/12/2018 02:21 AM    INR 1.04 03/12/2018 02:21 AM        Assessment: 28 yo female with choledocholithiasis and acute pancreatitis      Plan: Plan to OR today for ERCP

## 2018-03-13 NOTE — CONSULTS
Gastroenterology Consult Note     Date of Consult: 3/12/2018  Referring Physician: Bryon     Reason for consult: pancreatitis        HPI: This is a 26 yo female with abdominal pain who we are asked to see regarding pancreatitis. She says that she started having severe sharp stabbing pain yesterday. This was rated as a 10/10 and radiated to her back. She says that her pain got worse with movement and deep breathing. She notes that she had nausea and vomiting. She had symptoms similar to this 3 weeks ago and she was supposed to get her GB removed for stones but she did not have this done yet. Upon admission, the patient's LFTs were elevated and we were consulted for possible ERCP     PMHX:NO past medical history       PSurgHx: No surgeries     ALLERGIES:Patient has no known allergies.     SocHx:   Social History     Social History   • Marital status: Single     Spouse name: N/A   • Number of children: N/A   • Years of education: N/A     Occupational History   • Not on file.     Social History Main Topics   • Smoking status: Never Smoker   • Smokeless tobacco: Never Used   • Alcohol use No   • Drug use: No   • Sexual activity: Not on file     Other Topics Concern   • Not on file     Social History Narrative   • No narrative on file        FAMHx: No family history of GI disease       ROS:  Constitutional: No fevers, chills, no night sweats, no weight changes  HEENT: no vision or hearing changes, no dry mouth, no change in smell  CARDIO: no palpitations, no orthopnea, no chest pain  PULM: no cough, no shortness of breath  NEURO: no Seizures, no memory impairment, no change in sensation  GI: as above  : no dysuria, no hematuria  HEME: no anemia, no easy brusing  MUSCULOSKELETAL: no muscle aches, no back pain, no arthritis  PSYCH: no anxiety or depression  SKIN: no rashes     PE:  Vitals:    03/12/18 0410 03/12/18 0747 03/12/18 1045 03/12/18 1550   BP: 138/87 120/82 (!)  99/67 108/66   Pulse: 64 82 78 81   Resp: 17 16 16 16   Temp: 36.5 °C (97.7 °F) 36.7 °C (98.1 °F) 36.6 °C (97.9 °F) 36.6 °C (97.9 °F)   SpO2: 100% 95% 94% 96%   Weight:       Height:         Gen: AAOx3, ill appearing sitting up in bed  HEENT: PERRL, EOMI, nares patent, Mucous membranes moist  Neck: supple, no cervical or supraclavicular adenopathy  CVS: regular rhythm, normal rate, no MRG  Pulm: CTAB, no crackles  Abd: soft, Nd,TTP with guarding  Ext: no edema, normal sensation  NEURO: grossly normal, no weakness  Skin: warm, no rash  Psych: normal Affect, no anxiety     LABS:  Lab Results   Component Value Date/Time    SODIUM 139 03/12/2018 02:21 AM    POTASSIUM 3.6 03/12/2018 02:21 AM    CHLORIDE 107 03/12/2018 02:21 AM    CO2 23 03/12/2018 02:21 AM    GLUCOSE 126 (H) 03/12/2018 02:21 AM    BUN 11 03/12/2018 02:21 AM    CREATININE 0.46 (L) 03/12/2018 02:21 AM      Lab Results   Component Value Date/Time    WBC 12.8 (H) 03/12/2018 02:21 AM    RBC 4.18 (L) 03/12/2018 02:21 AM    HEMOGLOBIN 12.7 03/12/2018 02:21 AM    HEMATOCRIT 39.0 03/12/2018 02:21 AM    MCV 93.3 03/12/2018 02:21 AM    MCH 30.4 03/12/2018 02:21 AM    MCHC 32.6 (L) 03/12/2018 02:21 AM    MPV 11.5 03/12/2018 02:21 AM    NEUTSPOLYS 90.20 (H) 03/12/2018 02:21 AM    LYMPHOCYTES 5.60 (L) 03/12/2018 02:21 AM    MONOCYTES 3.40 03/12/2018 02:21 AM    EOSINOPHILS 0.00 03/12/2018 02:21 AM    BASOPHILS 0.30 03/12/2018 02:21 AM        Lab Results   Component Value Date/Time    PROTHROMBTM 13.3 03/12/2018 02:21 AM    INR 1.04 03/12/2018 02:21 AM      Recent Labs      03/12/18   0221   ASTSGOT  225*   ALTSGPT  642*   TBILIRUBIN  1.7*   GLOBULIN  2.1   INR  1.04          Problem List Items Addressed This Visit     None      Visit Diagnoses     Idiopathic acute pancreatitis without infection or necrosis        Relevant Medications    oxyCODONE immediate-release (ROXICODONE) tablet 5 mg    oxyCODONE immediate release (ROXICODONE) tablet 10 mg    HYDROmorphone  (DILAUDID) injection 0.5 mg           ASSESSMENT: 26 yo with pancreatitis suspected to be gallstone related. She has elevated LFTs but is clearly very uncomfortable now. Will confirm choledocholithiasis with MRCP and tentatively plan for ERCP pending this result     PLAN:   1) MRCP  2)  IV hydration with LR  3) ERCP pending MRCP results  4) lap alexandra per surgery  5) monitor for improvement to determine timing of ERCP if necessary      Thank you for this consult.     Lawrence Ramirez MD

## 2018-03-13 NOTE — PROGRESS NOTES
Surgery    Successful ERCP  Will monitor progress  Likely tai morris Thursday, time TBD    Trenton Becker MD  General&Vascular Surgery  [facility]  Cell: [phone]  Office: [phone]

## 2018-03-13 NOTE — PROGRESS NOTES
Report received from PACU RN.  Arrived with transport via [facility].  Patient is A/Ox4. On room air.   States pain 3/10. Declines intervention.  Patient ambulated to bathroom with no assistance.   +void.  NPO with sips and ice chip allowance.  Resting in bed. Family at bedside.  Call light within reach.

## 2018-03-13 NOTE — PROGRESS NOTES
Patient back from MRI.  Clean catch urine specimen collected and sent to lab.  A&O x4.  Ambulating with no assistance, steady gait.  C/o 5/10 pain, medicated per MAR.  NS at 175.  NPO. Denies nausea at this time.  Hypoactive BS.  - flatus, -BM.  + void.  Call light and personal belongings within reach.  Family at bedside.  POC discussed and all questions answered.  No additional needs at this time.

## 2018-03-13 NOTE — PROGRESS NOTES
[facility] Hospitalist Progress Note    Date of Service: 3/13/2018    Chief Complaint  27 y.o. female admitted 3/12/2018 with abodminal pain found to have gallstone pancreatitis.     Interval Problem Update  Pt seen and examined, stil with abdominal pain, MRCP positive for choledocholithiasis, Gi and surgery following, plan for ERCP today by GI and then cholecystectomy in the coming days by surgery  Appreciate rec.      Consultants/Specialty  GI: Dr. Ramirez  Surgery: Dr. Becker     Disposition  TBD     Ultrasound of gallbladder shows cholelithiasis with dilated common bile duct findings may represent choledocholithiasis are recently passed common duct stone with residual common duct enlargement.        Review of Systems   Constitutional: Negative for chills and fever.   HENT: Negative for sore throat.    Eyes: Negative for blurred vision and pain.   Respiratory: Negative for cough and shortness of breath.    Cardiovascular: Negative for chest pain and palpitations.   Gastrointestinal: Positive for abdominal pain. Negative for nausea and vomiting.   Genitourinary: Negative for dysuria and urgency.   Musculoskeletal: Negative for back pain and neck pain.   Skin: Negative for itching and rash.   Neurological: Negative for dizziness, tingling and headaches.   Psychiatric/Behavioral: Negative for depression. The patient does not have insomnia.    All other systems reviewed and are negative.     Physical Exam  Laboratory/Imaging   Hemodynamics  Temp (24hrs), Av.9 °C (98.4 °F), Min:36.6 °C (97.9 °F), Max:37.1 °C (98.8 °F)   Temperature: 37.1 °C (98.7 °F)  Pulse  Av.6  Min: 64  Max: 104    Blood Pressure: 105/67      Respiratory      Respiration: 18, Pulse Oximetry: 93 %             Fluids    Intake/Output Summary (Last 24 hours) at 18 1209  Last data filed at 18 0800   Gross per 24 hour   Intake             1500 ml   Output             1800 ml   Net             -300 ml       Nutrition  Orders Placed This  Encounter   Procedures   • Diet NPO     Standing Status:   Standing     Number of Occurrences:   1     Order Specific Question:   Restrict to:     Answer:   Strict [1]     Physical Exam   Constitutional: She is oriented to person, place, and time. She appears well-developed and well-nourished. No distress.   HENT:   Right Ear: External ear normal.   Left Ear: External ear normal.   Nose: Nose normal.   Eyes: Conjunctivae are normal. Right eye exhibits no discharge. Left eye exhibits no discharge.   Neck: No JVD present.   Cardiovascular: Regular rhythm and normal heart sounds.    No murmur heard.  Cap refill 2sec  Pulses 2+ throughout  Normal skin  Color.    Pulmonary/Chest: Effort normal and breath sounds normal. No stridor. No respiratory distress. She has no wheezes. She has no rales.   Abdominal: Soft. Bowel sounds are normal. She exhibits no distension. There is tenderness.   Musculoskeletal: She exhibits no edema or tenderness.   Neurological: She is alert and oriented to person, place, and time.   Skin: Skin is warm and dry. She is not diaphoretic. No erythema.   Psychiatric: She has a normal mood and affect. Her behavior is normal.   Nursing note and vitals reviewed.      Recent Labs      03/12/18 0221 03/13/18 0430   WBC  12.8*  12.0*   RBC  4.18*  4.33   HEMOGLOBIN  12.7  12.6   HEMATOCRIT  39.0  40.7   MCV  93.3  94.0   MCH  30.4  29.1   MCHC  32.6*  31.0*   RDW  46.8  47.3   PLATELETCT  320  297   MPV  11.5  11.5     Recent Labs      03/12/18 0221 03/13/18 0430   SODIUM  139  134*   POTASSIUM  3.6  3.2*   CHLORIDE  107  104   CO2  23  22   GLUCOSE  126*  66   BUN  11  6*   CREATININE  0.46*  0.39*   CALCIUM  7.9*  8.0*     Recent Labs      03/12/18 0221   APTT  29.6   INR  1.04         Recent Labs      03/13/18 0430   TRIGLYCERIDE  143   HDL  42   LDL  98          Assessment/Plan     * Gallstone pancreatitis   Assessment & Plan    IVF   Anti emetics  Pain control   Plan for cholecystectomy  after ERCP  Surgery following appreciate rec.           Cholecystitis   Assessment & Plan    Surgery following, plan for ERCP today by GI, and then in the coming days, will have cholecustectomy  Appreciate rec.    cont IV abx  Pain control iv fluids            Sepsis (CMS-HCC)   Assessment & Plan    This is sepsis (without associated acute organ dysfunction).   2/2 cholecystitis likely  Cont on IVF and IV abx   Cultures pending          Choledocholithiasis   Assessment & Plan    Noted on outside ultrasound.   With common bile duct enlargement   MRCP also positive for choledocholithiasis   GI following, plan for ERCP today.  Appreciate rec.           Quality-Core Measures   Reviewed items::  EKG reviewed, Radiology images reviewed, Labs reviewed and Medications reviewed  Leon catheter::  No Leon  DVT prophylaxis pharmacological::  Heparin  DVT prophylaxis - mechanical:  SCDs  Ulcer Prophylaxis::  Yes  Antibiotics:  Treating active infection/contamination beyond 24 hours perioperative coverage  Assessed for rehabilitation services:  Patient was assess for and/or received rehabilitation services during this hospitalization

## 2018-03-13 NOTE — PROGRESS NOTES
1428 Pt transferred to PACU. Report received from OR RN and anesthesia. Pt responds to commands. Appears to have no distress at this time. VS stable, respirations even and unlabored. No complaints of pain or nausea.    1455 Pt tolerating sips of water. No complaints of pain or nausea. Pt's sister at bedside.    1514 Report to BRANDON Saini.      1532 Pt with transport to T407/1.

## 2018-03-13 NOTE — PROGRESS NOTES
Received report from NOC RN.  Patient A/Ox4. On room air.  Clear breath sounds.  Normoactive bowel sounds.  States her pain is 2/10 and tolerable.  -BM. -flatus. +void  NS at 175 mL/hr and Zosyn running.  Call light and personal belongings within reach.

## 2018-03-14 LAB
ALBUMIN SERPL BCP-MCNC: 3.4 G/DL (ref 3.2–4.9)
ALBUMIN/GLOB SERPL: 1.4 G/DL
ALP SERPL-CCNC: 337 U/L (ref 30–99)
ALT SERPL-CCNC: 318 U/L (ref 2–50)
ANION GAP SERPL CALC-SCNC: 10 MMOL/L (ref 0–11.9)
AST SERPL-CCNC: 49 U/L (ref 12–45)
BASOPHILS # BLD AUTO: 0.2 % (ref 0–1.8)
BASOPHILS # BLD: 0.03 K/UL (ref 0–0.12)
BILIRUB SERPL-MCNC: 1.2 MG/DL (ref 0.1–1.5)
BUN SERPL-MCNC: 8 MG/DL (ref 8–22)
CALCIUM SERPL-MCNC: 8.7 MG/DL (ref 8.5–10.5)
CHLORIDE SERPL-SCNC: 108 MMOL/L (ref 96–112)
CO2 SERPL-SCNC: 22 MMOL/L (ref 20–33)
CREAT SERPL-MCNC: 0.41 MG/DL (ref 0.5–1.4)
EOSINOPHIL # BLD AUTO: 0 K/UL (ref 0–0.51)
EOSINOPHIL NFR BLD: 0 % (ref 0–6.9)
ERYTHROCYTE [DISTWIDTH] IN BLOOD BY AUTOMATED COUNT: 46.1 FL (ref 35.9–50)
GLOBULIN SER CALC-MCNC: 2.4 G/DL (ref 1.9–3.5)
GLUCOSE SERPL-MCNC: 111 MG/DL (ref 65–99)
HCT VFR BLD AUTO: 35.9 % (ref 37–47)
HGB BLD-MCNC: 11.3 G/DL (ref 12–16)
IMM GRANULOCYTES # BLD AUTO: 0.12 K/UL (ref 0–0.11)
IMM GRANULOCYTES NFR BLD AUTO: 0.8 % (ref 0–0.9)
LYMPHOCYTES # BLD AUTO: 0.86 K/UL (ref 1–4.8)
LYMPHOCYTES NFR BLD: 6 % (ref 22–41)
MCH RBC QN AUTO: 29.7 PG (ref 27–33)
MCHC RBC AUTO-ENTMCNC: 31.5 G/DL (ref 33.6–35)
MCV RBC AUTO: 94.2 FL (ref 81.4–97.8)
MONOCYTES # BLD AUTO: 0.53 K/UL (ref 0–0.85)
MONOCYTES NFR BLD AUTO: 3.7 % (ref 0–13.4)
NEUTROPHILS # BLD AUTO: 12.9 K/UL (ref 2–7.15)
NEUTROPHILS NFR BLD: 89.3 % (ref 44–72)
NRBC # BLD AUTO: 0 K/UL
NRBC BLD-RTO: 0 /100 WBC
PLATELET # BLD AUTO: 269 K/UL (ref 164–446)
PMV BLD AUTO: 11.4 FL (ref 9–12.9)
POTASSIUM SERPL-SCNC: 3.7 MMOL/L (ref 3.6–5.5)
PROT SERPL-MCNC: 5.8 G/DL (ref 6–8.2)
RBC # BLD AUTO: 3.81 M/UL (ref 4.2–5.4)
SODIUM SERPL-SCNC: 140 MMOL/L (ref 135–145)
WBC # BLD AUTO: 14.4 K/UL (ref 4.8–10.8)

## 2018-03-14 PROCEDURE — A9270 NON-COVERED ITEM OR SERVICE: HCPCS | Performed by: HOSPITALIST

## 2018-03-14 PROCEDURE — 700105 HCHG RX REV CODE 258: Performed by: HOSPITALIST

## 2018-03-14 PROCEDURE — 700111 HCHG RX REV CODE 636 W/ 250 OVERRIDE (IP): Performed by: HOSPITALIST

## 2018-03-14 PROCEDURE — 36415 COLL VENOUS BLD VENIPUNCTURE: CPT

## 2018-03-14 PROCEDURE — 99232 SBSQ HOSP IP/OBS MODERATE 35: CPT | Performed by: INTERNAL MEDICINE

## 2018-03-14 PROCEDURE — 85025 COMPLETE CBC W/AUTO DIFF WBC: CPT

## 2018-03-14 PROCEDURE — 80053 COMPREHEN METABOLIC PANEL: CPT

## 2018-03-14 PROCEDURE — 770006 HCHG ROOM/CARE - MED/SURG/GYN SEMI*

## 2018-03-14 PROCEDURE — 700102 HCHG RX REV CODE 250 W/ 637 OVERRIDE(OP): Performed by: HOSPITALIST

## 2018-03-14 RX ADMIN — HEPARIN SODIUM 5000 UNITS: 5000 INJECTION, SOLUTION INTRAVENOUS; SUBCUTANEOUS at 13:27

## 2018-03-14 RX ADMIN — PIPERACILLIN SODIUM AND TAZOBACTAM SODIUM 3.38 G: 3; .375 INJECTION, POWDER, FOR SOLUTION INTRAVENOUS at 13:24

## 2018-03-14 RX ADMIN — OXYCODONE HYDROCHLORIDE 5 MG: 5 TABLET ORAL at 15:21

## 2018-03-14 RX ADMIN — STANDARDIZED SENNA CONCENTRATE AND DOCUSATE SODIUM 2 TABLET: 8.6; 5 TABLET, FILM COATED ORAL at 21:10

## 2018-03-14 RX ADMIN — SODIUM CHLORIDE: 9 INJECTION, SOLUTION INTRAVENOUS at 08:52

## 2018-03-14 RX ADMIN — PIPERACILLIN SODIUM AND TAZOBACTAM SODIUM 3.38 G: 3; .375 INJECTION, POWDER, FOR SOLUTION INTRAVENOUS at 05:21

## 2018-03-14 RX ADMIN — SODIUM CHLORIDE: 9 INJECTION, SOLUTION INTRAVENOUS at 21:10

## 2018-03-14 RX ADMIN — PIPERACILLIN SODIUM AND TAZOBACTAM SODIUM 3.38 G: 3; .375 INJECTION, POWDER, FOR SOLUTION INTRAVENOUS at 23:15

## 2018-03-14 RX ADMIN — HYDROMORPHONE HYDROCHLORIDE 0.5 MG: 10 INJECTION, SOLUTION INTRAMUSCULAR; INTRAVENOUS; SUBCUTANEOUS at 21:15

## 2018-03-14 RX ADMIN — SODIUM CHLORIDE: 9 INJECTION, SOLUTION INTRAVENOUS at 15:22

## 2018-03-14 RX ADMIN — OXYCODONE HYDROCHLORIDE 10 MG: 10 TABLET ORAL at 21:10

## 2018-03-14 ASSESSMENT — ENCOUNTER SYMPTOMS
TINGLING: 0
INSOMNIA: 0
ABDOMINAL PAIN: 1
VOMITING: 0
HEADACHES: 0
DIZZINESS: 0
NECK PAIN: 0
BLURRED VISION: 0
BACK PAIN: 0
SHORTNESS OF BREATH: 0
PALPITATIONS: 0
NAUSEA: 0
CHILLS: 0
DEPRESSION: 0
EYE PAIN: 0
SORE THROAT: 0
COUGH: 0
FEVER: 0

## 2018-03-14 ASSESSMENT — PAIN SCALES - GENERAL
PAINLEVEL_OUTOF10: 4
PAINLEVEL_OUTOF10: 8
PAINLEVEL_OUTOF10: 4
PAINLEVEL_OUTOF10: 7
PAINLEVEL_OUTOF10: 4

## 2018-03-14 ASSESSMENT — LIFESTYLE VARIABLES: DO YOU DRINK ALCOHOL: NO

## 2018-03-14 NOTE — PROGRESS NOTES
Report received.  Assumed Care.  Pt in bed, 407 1. AOx4, responds appropriately.  Denies pain, SOB.  Plan of care discussed.  Explained importance of calling before getting OOB and pt verbalizes understanding.  Call light and belongings within reach, treaded slipper socks on, bed alarm in use, bed in lowest position.  Will monitor frequently.

## 2018-03-14 NOTE — CARE PLAN
Problem: Venous Thromboembolism (VTW)/Deep Vein Thrombosis (DVT) Prevention:  Goal: Patient will participate in Venous Thrombosis (VTE)/Deep Vein Thrombosis (DVT)Prevention Measures  Outcome: PROGRESSING AS EXPECTED  Patient given subcutaneous heparin for DVT prophylaxis.      Problem: Bowel/Gastric:  Goal: Normal bowel function is maintained or improved  Outcome: PROGRESSING AS EXPECTED  Patient started on clear liquid diet, per MD.  NPO at midnight for surgery in the AM.

## 2018-03-14 NOTE — PROGRESS NOTES
Received report from evening RN.  Assumed care of patient.  Patient A&Ox4.  C/O light cramping in abdomen, declines medication.  NPO per order.  Awaiting surgery.  Ambulating with SBA and steady gait.  +flatus, NO BM.  +void.  No nausea or vomiting.  Plan of care discussed.  Call light within reach.  Bed in lowest position.

## 2018-03-14 NOTE — CARE PLAN
Problem: Safety  Goal: Will remain free from injury  Outcome: PROGRESSING AS EXPECTED  Patient calls for assistance when getting up to ambulate and go to the bathroom. Wearing treaded slipper socks.    Problem: Venous Thromboembolism (VTW)/Deep Vein Thrombosis (DVT) Prevention:  Goal: Patient will participate in Venous Thrombosis (VTE)/Deep Vein Thrombosis (DVT)Prevention Measures  Outcome: PROGRESSING AS EXPECTED  Patient wearing SCDs. Ambulates to the bathroom and around the unit.

## 2018-03-14 NOTE — CARE PLAN
Problem: Infection  Goal: Will remain free from infection    Intervention: Implement standard precautions and perform hand washing before and after patient contact  Educated importance to continuing with infection prevention.       Problem: Knowledge Deficit  Goal: Knowledge of disease process/condition, treatment plan, diagnostic tests, and medications will improve    Intervention: Explain information regarding disease process/condition, treatment plan, diagnostic tests, and medications and document in education  Discussed upcoming surgery. What to expect once back on floor for recovery.

## 2018-03-14 NOTE — PROGRESS NOTES
Gastroenterology Progress Note    Date of Service: 3/14/2018    Subjective: Pt says that she feels better today. Reports significantly improved abdominal pain      Objective:    Vitals -   Vitals:    03/13/18 1925 03/14/18 0240 03/14/18 0805 03/14/18 1215   BP: 106/73 106/67 (!) 98/67 112/73   Pulse: (!) 109 80 87 93   Resp: 18 18 19 19   Temp: 36.7 °C (98 °F) 36.1 °C (97 °F) 36.7 °C (98 °F) 36.7 °C (98.1 °F)   SpO2: 94% 95% 95% 97%   Weight:       Height:           Gen: AAOx3, NAD  HEENT: anicteric, Nares patent, MMM  CVS: regular rhythm, normal rate  Pulm: CTAB, no wheezes  Abd: soft Nt, Nd, normal bowel sounds  Ext: no edema    Labs:  Lab Results   Component Value Date/Time    SODIUM 140 03/14/2018 02:40 AM    POTASSIUM 3.7 03/14/2018 02:40 AM    CHLORIDE 108 03/14/2018 02:40 AM    CO2 22 03/14/2018 02:40 AM    GLUCOSE 111 (H) 03/14/2018 02:40 AM    BUN 8 03/14/2018 02:40 AM    CREATININE 0.41 (L) 03/14/2018 02:40 AM      Lab Results   Component Value Date/Time    WBC 14.4 (H) 03/14/2018 02:40 AM    RBC 3.81 (L) 03/14/2018 02:40 AM    HEMOGLOBIN 11.3 (L) 03/14/2018 02:40 AM    HEMATOCRIT 35.9 (L) 03/14/2018 02:40 AM    MCV 94.2 03/14/2018 02:40 AM    MCH 29.7 03/14/2018 02:40 AM    MCHC 31.5 (L) 03/14/2018 02:40 AM    MPV 11.4 03/14/2018 02:40 AM    NEUTSPOLYS 89.30 (H) 03/14/2018 02:40 AM    LYMPHOCYTES 6.00 (L) 03/14/2018 02:40 AM    MONOCYTES 3.70 03/14/2018 02:40 AM    EOSINOPHILS 0.00 03/14/2018 02:40 AM    BASOPHILS 0.20 03/14/2018 02:40 AM      Lab Results   Component Value Date/Time    PROTHROMBTM 13.3 03/12/2018 02:21 AM    INR 1.04 03/12/2018 02:21 AM        Assessment: 26 yo with cholelithiasis and choledocholithiasis resulting in gallstone pancreatitis. She is now s/p ERCP with stone extraction.       Plan: Proceed to cholecystectomy. Please call [facility] if we can be of further assistance

## 2018-03-14 NOTE — PROGRESS NOTES
Per Dr. Becker, patient to have surgery tomorrow ( 3/15/18).  OK for patient to be on clear liquid diet, NPO at midnight.

## 2018-03-14 NOTE — PROGRESS NOTES
[facility] Hospitalist Progress Note    Date of Service: 3/14/2018    Chief Complaint  27 y.o. female admitted 3/12/2018 with abodminal pain found to have gallstone pancreatitis.     Interval Problem Update  No overnight events, had ERCP done yesterday,tolerated procedure well, surgery following, plan for cholecystectomy tomorrow   GI following appreciate rec.     Consultants/Specialty  GI: Dr. Ramirez  Surgery: Dr. Becker     Disposition  TBD     Ultrasound of gallbladder shows cholelithiasis with dilated common bile duct findings may represent choledocholithiasis are recently passed common duct stone with residual common duct enlargement.        Review of Systems   Constitutional: Negative for chills and fever.   HENT: Negative for sore throat.    Eyes: Negative for blurred vision and pain.   Respiratory: Negative for cough and shortness of breath.    Cardiovascular: Negative for chest pain and palpitations.   Gastrointestinal: Positive for abdominal pain. Negative for nausea and vomiting.   Genitourinary: Negative for dysuria and urgency.   Musculoskeletal: Negative for back pain and neck pain.   Skin: Negative for itching and rash.   Neurological: Negative for dizziness, tingling and headaches.   Psychiatric/Behavioral: Negative for depression. The patient does not have insomnia.    All other systems reviewed and are negative.     Physical Exam  Laboratory/Imaging   Hemodynamics  Temp (24hrs), Av.8 °C (98.3 °F), Min:36.1 °C (97 °F), Max:37.8 °C (100 °F)   Temperature: 36.7 °C (98.1 °F)  Pulse  Av.4  Min: 64  Max: 117 Heart Rate (Monitored): (!) 113  Blood Pressure: 112/73, NIBP: 111/67      Respiratory      Respiration: 19, Pulse Oximetry: 97 %             Fluids    Intake/Output Summary (Last 24 hours) at 18 1254  Last data filed at 18 0800   Gross per 24 hour   Intake             1810 ml   Output             1350 ml   Net              460 ml       Nutrition  Orders Placed This Encounter    Procedures   • DIET ORDER     Standing Status:   Standing     Number of Occurrences:   1     Order Specific Question:   Diet:     Answer:   Clear Liquid [10]   • DIET NPO     Standing Status:   Standing     Number of Occurrences:   8     Order Specific Question:   Restrict to:     Answer:   Strict [1]     Physical Exam   Constitutional: She is oriented to person, place, and time. She appears well-developed and well-nourished. No distress.   HENT:   Right Ear: External ear normal.   Left Ear: External ear normal.   Nose: Nose normal.   Eyes: Conjunctivae are normal. Right eye exhibits no discharge. Left eye exhibits no discharge.   Neck: No JVD present.   Cardiovascular: Regular rhythm and normal heart sounds.    No murmur heard.  Cap refill 2sec  Pulses 2+ throughout  Normal skin  Color.    Pulmonary/Chest: Effort normal and breath sounds normal. No stridor. No respiratory distress. She has no wheezes. She has no rales.   Abdominal: Soft. Bowel sounds are normal. She exhibits no distension. There is tenderness.   Musculoskeletal: She exhibits no edema or tenderness.   Neurological: She is alert and oriented to person, place, and time.   Skin: Skin is warm and dry. She is not diaphoretic. No erythema.   Psychiatric: She has a normal mood and affect. Her behavior is normal.   Nursing note and vitals reviewed.      Recent Labs      03/12/18 0221 03/13/18   0430  03/14/18   0240   WBC  12.8*  12.0*  14.4*   RBC  4.18*  4.33  3.81*   HEMOGLOBIN  12.7  12.6  11.3*   HEMATOCRIT  39.0  40.7  35.9*   MCV  93.3  94.0  94.2   MCH  30.4  29.1  29.7   MCHC  32.6*  31.0*  31.5*   RDW  46.8  47.3  46.1   PLATELETCT  320  297  269   MPV  11.5  11.5  11.4     Recent Labs      03/12/18 0221 03/13/18   0430  03/14/18   0240   SODIUM  139  134*  140   POTASSIUM  3.6  3.2*  3.7   CHLORIDE  107  104  108   CO2  23  22  22   GLUCOSE  126*  66  111*   BUN  11  6*  8   CREATININE  0.46*  0.39*  0.41*   CALCIUM  7.9*  8.0*  8.7      Recent Labs      03/12/18   0221   APTT  29.6   INR  1.04         Recent Labs      03/13/18   0430   TRIGLYCERIDE  143   HDL  42   LDL  98          Assessment/Plan     * Gallstone pancreatitis   Assessment & Plan    IVF   Anti emetics  Pain control   Had ERCP yesterday , gi following appreciate rec.   Surgery following plan for possible cholecystectomy tomorrow appreciate rec.           Cholecystitis   Assessment & Plan    Surgery following, plan for possible cholecystectomy tomorrow  cholecystectomy   Appreciate rec.    cont IV abx  =            Sepsis (CMS-[facility])   Assessment & Plan    This is sepsis (without associated acute organ dysfunction).   2/2 cholecystitis likely  Cont on IVF and IV abx             Choledocholithiasis   Assessment & Plan    Noted on outside ultrasound.   With common bile duct enlargement   MRCP also positive for choledocholithiasis   Had ERCP yesterday, GI following appreciate rec.           Quality-Core Measures   Reviewed items::  EKG reviewed, Radiology images reviewed, Labs reviewed and Medications reviewed  Leon catheter::  No Leon  DVT prophylaxis pharmacological::  Heparin  DVT prophylaxis - mechanical:  SCDs  Ulcer Prophylaxis::  Yes  Antibiotics:  Treating active infection/contamination beyond 24 hours perioperative coverage  Assessed for rehabilitation services:  Patient was assess for and/or received rehabilitation services during this hospitalization

## 2018-03-15 PROBLEM — K85.10 GALLSTONE PANCREATITIS: Status: RESOLVED | Noted: 2018-03-12 | Resolved: 2018-03-15

## 2018-03-15 PROBLEM — G89.18 POSTOPERATIVE PAIN: Status: ACTIVE | Noted: 2018-03-15

## 2018-03-15 PROBLEM — K81.9 CHOLECYSTITIS: Status: RESOLVED | Noted: 2018-03-12 | Resolved: 2018-03-15

## 2018-03-15 PROBLEM — A41.9 SEPSIS (HCC): Status: RESOLVED | Noted: 2018-03-12 | Resolved: 2018-03-15

## 2018-03-15 PROBLEM — K80.50 CHOLEDOCHOLITHIASIS: Status: RESOLVED | Noted: 2018-03-12 | Resolved: 2018-03-15

## 2018-03-15 LAB
ALBUMIN SERPL BCP-MCNC: 3.2 G/DL (ref 3.2–4.9)
ALBUMIN/GLOB SERPL: 1.5 G/DL
ALP SERPL-CCNC: 246 U/L (ref 30–99)
ALT SERPL-CCNC: 209 U/L (ref 2–50)
ANION GAP SERPL CALC-SCNC: 7 MMOL/L (ref 0–11.9)
AST SERPL-CCNC: 24 U/L (ref 12–45)
BILIRUB SERPL-MCNC: 1 MG/DL (ref 0.1–1.5)
BUN SERPL-MCNC: 7 MG/DL (ref 8–22)
CALCIUM SERPL-MCNC: 8 MG/DL (ref 8.5–10.5)
CHLORIDE SERPL-SCNC: 109 MMOL/L (ref 96–112)
CO2 SERPL-SCNC: 24 MMOL/L (ref 20–33)
CREAT SERPL-MCNC: 0.4 MG/DL (ref 0.5–1.4)
ERYTHROCYTE [DISTWIDTH] IN BLOOD BY AUTOMATED COUNT: 45 FL (ref 35.9–50)
GLOBULIN SER CALC-MCNC: 2.1 G/DL (ref 1.9–3.5)
GLUCOSE SERPL-MCNC: 100 MG/DL (ref 65–99)
HCT VFR BLD AUTO: 33.4 % (ref 37–47)
HGB BLD-MCNC: 10.9 G/DL (ref 12–16)
MCH RBC QN AUTO: 30.2 PG (ref 27–33)
MCHC RBC AUTO-ENTMCNC: 32.6 G/DL (ref 33.6–35)
MCV RBC AUTO: 92.5 FL (ref 81.4–97.8)
PLATELET # BLD AUTO: 291 K/UL (ref 164–446)
PMV BLD AUTO: 11.6 FL (ref 9–12.9)
POTASSIUM SERPL-SCNC: 3 MMOL/L (ref 3.6–5.5)
PROT SERPL-MCNC: 5.3 G/DL (ref 6–8.2)
RBC # BLD AUTO: 3.61 M/UL (ref 4.2–5.4)
SODIUM SERPL-SCNC: 140 MMOL/L (ref 135–145)
WBC # BLD AUTO: 14 K/UL (ref 4.8–10.8)

## 2018-03-15 PROCEDURE — A9270 NON-COVERED ITEM OR SERVICE: HCPCS

## 2018-03-15 PROCEDURE — 501570 HCHG TROCAR, SEPARATOR: Performed by: SURGERY

## 2018-03-15 PROCEDURE — 700105 HCHG RX REV CODE 258: Performed by: STUDENT IN AN ORGANIZED HEALTH CARE EDUCATION/TRAINING PROGRAM

## 2018-03-15 PROCEDURE — 85027 COMPLETE CBC AUTOMATED: CPT

## 2018-03-15 PROCEDURE — 88304 TISSUE EXAM BY PATHOLOGIST: CPT

## 2018-03-15 PROCEDURE — 160048 HCHG OR STATISTICAL LEVEL 1-5: Performed by: SURGERY

## 2018-03-15 PROCEDURE — 700111 HCHG RX REV CODE 636 W/ 250 OVERRIDE (IP): Performed by: HOSPITALIST

## 2018-03-15 PROCEDURE — 501583 HCHG TROCAR, THRD CAN&SEAL 5X100: Performed by: SURGERY

## 2018-03-15 PROCEDURE — 700102 HCHG RX REV CODE 250 W/ 637 OVERRIDE(OP): Performed by: INTERNAL MEDICINE

## 2018-03-15 PROCEDURE — 160002 HCHG RECOVERY MINUTES (STAT): Performed by: SURGERY

## 2018-03-15 PROCEDURE — 700102 HCHG RX REV CODE 250 W/ 637 OVERRIDE(OP)

## 2018-03-15 PROCEDURE — 700111 HCHG RX REV CODE 636 W/ 250 OVERRIDE (IP)

## 2018-03-15 PROCEDURE — 700105 HCHG RX REV CODE 258: Performed by: HOSPITALIST

## 2018-03-15 PROCEDURE — 500516 HCHG ENDOLOOP II 0 VIOLET 18: Performed by: SURGERY

## 2018-03-15 PROCEDURE — 0FT44ZZ RESECTION OF GALLBLADDER, PERCUTANEOUS ENDOSCOPIC APPROACH: ICD-10-PCS | Performed by: SURGERY

## 2018-03-15 PROCEDURE — 500697 HCHG HEMOCLIP, LARGE (ORANGE): Performed by: SURGERY

## 2018-03-15 PROCEDURE — 500002 HCHG ADHESIVE, DERMABOND: Performed by: SURGERY

## 2018-03-15 PROCEDURE — 160036 HCHG PACU - EA ADDL 30 MINS PHASE I: Performed by: SURGERY

## 2018-03-15 PROCEDURE — 80053 COMPREHEN METABOLIC PANEL: CPT

## 2018-03-15 PROCEDURE — 770006 HCHG ROOM/CARE - MED/SURG/GYN SEMI*

## 2018-03-15 PROCEDURE — 501582 HCHG TROCAR, THRD BLADED: Performed by: SURGERY

## 2018-03-15 PROCEDURE — 160039 HCHG SURGERY MINUTES - EA ADDL 1 MIN LEVEL 3: Performed by: SURGERY

## 2018-03-15 PROCEDURE — A9270 NON-COVERED ITEM OR SERVICE: HCPCS | Performed by: INTERNAL MEDICINE

## 2018-03-15 PROCEDURE — 160035 HCHG PACU - 1ST 60 MINS PHASE I: Performed by: SURGERY

## 2018-03-15 PROCEDURE — 501838 HCHG SUTURE GENERAL: Performed by: SURGERY

## 2018-03-15 PROCEDURE — 160009 HCHG ANES TIME/MIN: Performed by: SURGERY

## 2018-03-15 PROCEDURE — 36415 COLL VENOUS BLD VENIPUNCTURE: CPT

## 2018-03-15 PROCEDURE — 700101 HCHG RX REV CODE 250

## 2018-03-15 PROCEDURE — 700111 HCHG RX REV CODE 636 W/ 250 OVERRIDE (IP): Performed by: STUDENT IN AN ORGANIZED HEALTH CARE EDUCATION/TRAINING PROGRAM

## 2018-03-15 PROCEDURE — 700102 HCHG RX REV CODE 250 W/ 637 OVERRIDE(OP): Performed by: HOSPITALIST

## 2018-03-15 PROCEDURE — 99232 SBSQ HOSP IP/OBS MODERATE 35: CPT | Performed by: INTERNAL MEDICINE

## 2018-03-15 PROCEDURE — 502571 HCHG PACK, LAP CHOLE: Performed by: SURGERY

## 2018-03-15 PROCEDURE — A9270 NON-COVERED ITEM OR SERVICE: HCPCS | Performed by: HOSPITALIST

## 2018-03-15 PROCEDURE — 160028 HCHG SURGERY MINUTES - 1ST 30 MINS LEVEL 3: Performed by: SURGERY

## 2018-03-15 RX ORDER — ONDANSETRON 4 MG/1
4 TABLET, FILM COATED ORAL EVERY 4 HOURS PRN
Qty: 20 TAB | Refills: 3 | Status: SHIPPED | OUTPATIENT
Start: 2018-03-15

## 2018-03-15 RX ORDER — POTASSIUM CHLORIDE 20 MEQ/1
40 TABLET, EXTENDED RELEASE ORAL 2 TIMES DAILY
Status: COMPLETED | OUTPATIENT
Start: 2018-03-15 | End: 2018-03-17

## 2018-03-15 RX ORDER — MAGNESIUM HYDROXIDE 1200 MG/15ML
LIQUID ORAL
Status: DISCONTINUED | OUTPATIENT
Start: 2018-03-15 | End: 2018-03-15 | Stop reason: HOSPADM

## 2018-03-15 RX ORDER — BUPIVACAINE HYDROCHLORIDE AND EPINEPHRINE 5; 5 MG/ML; UG/ML
INJECTION, SOLUTION EPIDURAL; INTRACAUDAL; PERINEURAL
Status: DISCONTINUED | OUTPATIENT
Start: 2018-03-15 | End: 2018-03-15 | Stop reason: HOSPADM

## 2018-03-15 RX ORDER — OXYCODONE HCL 5 MG/5 ML
SOLUTION, ORAL ORAL
Status: COMPLETED
Start: 2018-03-15 | End: 2018-03-15

## 2018-03-15 RX ORDER — SODIUM CHLORIDE, SODIUM LACTATE, POTASSIUM CHLORIDE, CALCIUM CHLORIDE 600; 310; 30; 20 MG/100ML; MG/100ML; MG/100ML; MG/100ML
INJECTION, SOLUTION INTRAVENOUS
Status: DISCONTINUED | OUTPATIENT
Start: 2018-03-15 | End: 2018-03-15 | Stop reason: HOSPADM

## 2018-03-15 RX ORDER — HYDROCODONE BITARTRATE AND ACETAMINOPHEN 5; 325 MG/1; MG/1
1-2 TABLET ORAL EVERY 4 HOURS PRN
Qty: 30 TAB | Refills: 0 | Status: SHIPPED | OUTPATIENT
Start: 2018-03-15 | End: 2018-03-19

## 2018-03-15 RX ORDER — DOCUSATE SODIUM 100 MG/1
100 CAPSULE, LIQUID FILLED ORAL 2 TIMES DAILY
Qty: 30 CAP | Refills: 3 | Status: SHIPPED | OUTPATIENT
Start: 2018-03-15

## 2018-03-15 RX ADMIN — OXYCODONE HYDROCHLORIDE 10 MG: 10 TABLET ORAL at 16:35

## 2018-03-15 RX ADMIN — HEPARIN SODIUM 5000 UNITS: 5000 INJECTION, SOLUTION INTRAVENOUS; SUBCUTANEOUS at 20:26

## 2018-03-15 RX ADMIN — OXYCODONE HYDROCHLORIDE 10 MG: 10 TABLET ORAL at 02:59

## 2018-03-15 RX ADMIN — OXYCODONE HYDROCHLORIDE 10 MG: 10 TABLET ORAL at 20:26

## 2018-03-15 RX ADMIN — FENTANYL CITRATE 50 MCG: 50 INJECTION, SOLUTION INTRAMUSCULAR; INTRAVENOUS at 13:15

## 2018-03-15 RX ADMIN — FENTANYL CITRATE 50 MCG: 50 INJECTION, SOLUTION INTRAMUSCULAR; INTRAVENOUS at 12:47

## 2018-03-15 RX ADMIN — PIPERACILLIN SODIUM AND TAZOBACTAM SODIUM 3.38 G: 3; .375 INJECTION, POWDER, FOR SOLUTION INTRAVENOUS at 05:05

## 2018-03-15 RX ADMIN — PIPERACILLIN SODIUM AND TAZOBACTAM SODIUM 3.38 G: 3; .375 INJECTION, POWDER, FOR SOLUTION INTRAVENOUS at 14:43

## 2018-03-15 RX ADMIN — PIPERACILLIN SODIUM AND TAZOBACTAM SODIUM 3.38 G: 3; .375 INJECTION, POWDER, FOR SOLUTION INTRAVENOUS at 20:33

## 2018-03-15 RX ADMIN — STANDARDIZED SENNA CONCENTRATE AND DOCUSATE SODIUM 2 TABLET: 8.6; 5 TABLET, FILM COATED ORAL at 20:26

## 2018-03-15 RX ADMIN — POTASSIUM CHLORIDE 20 MEQ: 2 INJECTION, SOLUTION, CONCENTRATE INTRAVENOUS at 05:05

## 2018-03-15 RX ADMIN — OXYCODONE HYDROCHLORIDE 10 MG: 5 SOLUTION ORAL at 13:13

## 2018-03-15 RX ADMIN — SODIUM CHLORIDE: 9 INJECTION, SOLUTION INTRAVENOUS at 02:57

## 2018-03-15 RX ADMIN — HYDROMORPHONE HYDROCHLORIDE 0.5 MG: 10 INJECTION, SOLUTION INTRAMUSCULAR; INTRAVENOUS; SUBCUTANEOUS at 05:05

## 2018-03-15 RX ADMIN — POTASSIUM CHLORIDE 40 MEQ: 1500 TABLET, EXTENDED RELEASE ORAL at 14:43

## 2018-03-15 RX ADMIN — FENTANYL CITRATE 25 MCG: 50 INJECTION, SOLUTION INTRAMUSCULAR; INTRAVENOUS at 12:35

## 2018-03-15 ASSESSMENT — ENCOUNTER SYMPTOMS
PALPITATIONS: 0
VOMITING: 0
EYE PAIN: 0
SORE THROAT: 0
SHORTNESS OF BREATH: 0
COUGH: 0
INSOMNIA: 0
NECK PAIN: 0
BLURRED VISION: 0
NAUSEA: 0
FEVER: 0
HEADACHES: 0
BACK PAIN: 0
DIZZINESS: 0
TINGLING: 0
ABDOMINAL PAIN: 1
DEPRESSION: 0
CHILLS: 0

## 2018-03-15 ASSESSMENT — PAIN SCALES - GENERAL
PAINLEVEL_OUTOF10: 5
PAINLEVEL_OUTOF10: 3
PAINLEVEL_OUTOF10: 6
PAINLEVEL_OUTOF10: 8
PAINLEVEL_OUTOF10: ASSUMED PAIN PRESENT
PAINLEVEL_OUTOF10: 6
PAINLEVEL_OUTOF10: 4
PAINLEVEL_OUTOF10: 6
PAINLEVEL_OUTOF10: 7
PAINLEVEL_OUTOF10: 3
PAINLEVEL_OUTOF10: 4
PAINLEVEL_OUTOF10: 2
PAINLEVEL_OUTOF10: 6

## 2018-03-15 ASSESSMENT — LIFESTYLE VARIABLES: DO YOU DRINK ALCOHOL: NO

## 2018-03-15 NOTE — PROGRESS NOTES
[facility] Hospitalist Progress Note    Date of Service: 3/15/2018    Chief Complaint  27 y.o. female admitted 3/12/2018 with abodminal pain found to have gallstone pancreatitis.     Interval Problem Update  Pt seen and examined  had ERCP done yesterday,tolerated procedure well, surgery following, cholecystectomy today   GI following appreciate rec.     Consultants/Specialty  GI: Dr. Ramirez  Surgery: Dr. Becker     Disposition  TBD     Ultrasound of gallbladder shows cholelithiasis with dilated common bile duct findings may represent choledocholithiasis are recently passed common duct stone with residual common duct enlargement.        Review of Systems   Constitutional: Negative for chills and fever.   HENT: Negative for sore throat.    Eyes: Negative for blurred vision and pain.   Respiratory: Negative for cough and shortness of breath.    Cardiovascular: Negative for chest pain and palpitations.   Gastrointestinal: Positive for abdominal pain. Negative for nausea and vomiting.   Genitourinary: Negative for dysuria and urgency.   Musculoskeletal: Negative for back pain and neck pain.   Skin: Negative for itching and rash.   Neurological: Negative for dizziness, tingling and headaches.   Psychiatric/Behavioral: Negative for depression. The patient does not have insomnia.    All other systems reviewed and are negative.     Physical Exam  Laboratory/Imaging   Hemodynamics  Temp (24hrs), Av.3 °C (99.1 °F), Min:37 °C (98.6 °F), Max:37.9 °C (100.2 °F)   Temperature: 37.3 °C (99.2 °F)  Pulse  Av.4  Min: 64  Max: 118 Heart Rate (Monitored): (!) 111  Blood Pressure: 113/76      Respiratory      Respiration: 18, Pulse Oximetry: 97 %             Fluids    Intake/Output Summary (Last 24 hours) at 03/15/18 1218  Last data filed at 03/15/18 1215   Gross per 24 hour   Intake             3040 ml   Output             3925 ml   Net             -885 ml       Nutrition  Orders Placed This Encounter   Procedures   • DIET NPO      Standing Status:   Standing     Number of Occurrences:   8     Order Specific Question:   Restrict to:     Answer:   Strict [1]     Physical Exam   Constitutional: She is oriented to person, place, and time. She appears well-developed and well-nourished. No distress.   HENT:   Right Ear: External ear normal.   Left Ear: External ear normal.   Nose: Nose normal.   Eyes: Conjunctivae are normal. Right eye exhibits no discharge. Left eye exhibits no discharge.   Neck: No JVD present.   Cardiovascular: Regular rhythm and normal heart sounds.    No murmur heard.  Cap refill 2sec  Pulses 2+ throughout  Normal skin  Color.    Pulmonary/Chest: Effort normal and breath sounds normal. No stridor. No respiratory distress. She has no wheezes. She has no rales.   Abdominal: Soft. Bowel sounds are normal. She exhibits no distension. There is tenderness.   Musculoskeletal: She exhibits no edema or tenderness.   Neurological: She is alert and oriented to person, place, and time.   Skin: Skin is warm and dry. She is not diaphoretic. No erythema.   Psychiatric: She has a normal mood and affect. Her behavior is normal.   Nursing note and vitals reviewed.      Recent Labs      03/13/18   0430  03/14/18   0240  03/15/18   0021   WBC  12.0*  14.4*  14.0*   RBC  4.33  3.81*  3.61*   HEMOGLOBIN  12.6  11.3*  10.9*   HEMATOCRIT  40.7  35.9*  33.4*   MCV  94.0  94.2  92.5   MCH  29.1  29.7  30.2   MCHC  31.0*  31.5*  32.6*   RDW  47.3  46.1  45.0   PLATELETCT  297  269  291   MPV  11.5  11.4  11.6     Recent Labs      03/13/18   0430  03/14/18   0240  03/15/18   0021   SODIUM  134*  140  140   POTASSIUM  3.2*  3.7  3.0*   CHLORIDE  104  108  109   CO2  22  22  24   GLUCOSE  66  111*  100*   BUN  6*  8  7*   CREATININE  0.39*  0.41*  0.40*   CALCIUM  8.0*  8.7  8.0*             Recent Labs      03/13/18   0430   TRIGLYCERIDE  143   HDL  42   LDL  98          Assessment/Plan     * Gallstone pancreatitis   Assessment & Plan    IVF   Anti  emetics  Pain control   Had ERCP yesterday , gi following appreciate rec.   Surgery following having cholecystectomy today appreciate rec.           Cholecystitis   Assessment & Plan    Surgery following, having cholecystectomy   Appreciate rec.    cont IV abx              Sepsis (CMS-[facility])   Assessment & Plan    This is sepsis (without associated acute organ dysfunction).   2/2 cholecystitis likely  Cont on IVF and IV abx             Choledocholithiasis   Assessment & Plan    Noted on outside ultrasound.   With common bile duct enlargement   MRCP also positive for choledocholithiasis   Had ERCP yesterday, GI following appreciate rec.           Quality-Core Measures   Reviewed items::  EKG reviewed, Radiology images reviewed, Labs reviewed and Medications reviewed  Leon catheter::  No Leon  DVT prophylaxis pharmacological::  Heparin  DVT prophylaxis - mechanical:  SCDs  Ulcer Prophylaxis::  Yes  Antibiotics:  Treating active infection/contamination beyond 24 hours perioperative coverage  Assessed for rehabilitation services:  Patient was assess for and/or received rehabilitation services during this hospitalization

## 2018-03-15 NOTE — PROGRESS NOTES
Surgery    Successful ERCP yesterday  Minimal pain  Tolerating PO  Abdomen unremarkable    Lap alexandra Thursday, tentatively 1100    I explained the operation, alternatives, and potential risks, including but not limited to bleeding, infection, injury to organs or intestines, possible exposure to xray and contrast, possible need for blood transfusion, possible need for open incision, risks of anesthesia, and also global risks such as stroke, heart attack, blood clots, and potentially not surviving the operation or the recovery.  All questions were answered. Patient understands and agrees to proceed.      Trenton Becker MD  General&Vascular Surgery  [facility]  Cell: [phone]  Office: [phone]

## 2018-03-15 NOTE — CARE PLAN
Problem: Fluid Volume:  Goal: Will maintain balanced intake and output    Intervention: Monitor, educate, and encourage compliance with therapeutic intake of liquids  Educated pt to leave void in hat for evaluation. Pt increase in pulse and decrease in BP, Pt will receive all ordered fluids on time. Will continue to monitor closely. Hourly rounding implemented

## 2018-03-15 NOTE — OR NURSING
Dr Maya called and provided with update on pt's HR and BP. No new orders at this time. IVF running, pain medication administered per anesthesia record.

## 2018-03-15 NOTE — PROGRESS NOTES
Mews score did not change with pain medications. Notified on call Hospitalist. No orders given at this time,

## 2018-03-15 NOTE — PROGRESS NOTES
Discharge Instructions: Laparoscopic Cholecystectomy  ==========================================    1. DIET: Upon discharge from the hospital you may resume your normal preoperative diet. Depending on how you are feeling and whether you have nausea or not, you may wish to stay with a bland diet for the first few days. However, you can advance this as quickly as you feel ready.    2. ACTIVITIES: After discharge from the hospital, you may resume routine activities. However, there should be no heavy lifting (greater than 15 pounds) and no strenuous activities until after your follow-up visit. Otherwise, routine activities of daily living are acceptable.    3. DRIVING: You may drive whenever you are off pain medications and are able to perform the activities needed to drive, i.e. turning, bending, twisting, etc.    4. BATHING: OK to shower starting one day after surgery.  The incisions are covered with skin glue which is waterproof.  It will start to peel off in 5-7 days which is normal.  Avoid submerging the incisions in water (tub, bath, pool) for at least a week.     5. BOWEL FUNCTION: A few patients, after this operation, will develop either frequent or loose stools after meals. This usually corrects itself after a few days to a few weeks. If this occurs, do not worry; it is not unusual and will resolve. Much more common than loose stools, is constipation. The combination of pain medication and decreased activity level can cause constipation in otherwise normal patients. If you feel this is occurring, take a laxative (Milk of Magnesia, Ex-Lax, Senokot, etc.) until the problem has resolved.    6. PAIN MEDICATION: You will be given a prescription for pain medication at discharge. Please take these as directed. It is important to remember not to take medications on an empty stomach as this may cause nausea.  You can transition from the prescription-strength pain medication to over-the-counter medications as your pain  improves, such as tylenol, ibuprofen, or Aleve.    7.CALL IF YOU HAVE: (1) Fevers to more than 101.5 F, (2) Unusual chest or leg pain, (3) Drainage or fluid from incision that may be foul smelling, increased tenderness or soreness at the wound or the wound edges are no longer together, redness or swelling at the incision site. Please do not hesitate to call with any other questions.     8. APPOINTMENT: Contact our office at [phone] to schedule a follow-up appointment about 2 weeks following your procedure.    If you have any additional questions, please do not hesitate to call the office and speak to either myself or the physician on call.    Office address:  [address] [place] [id number]  [phone]    Trenton Becker M.D.  [facility]    School/Work Excuse after Surgery  ____________________________    3/15/2018      To Whom it May Concern:     Nisha Keating underwent surgery at [facility] on 3/15/2018.    She is cleared to return to school/work 1 week after her operation.    She is restricted to light activity for 3 weeks from the date of surgery.  During that time, she cannot lift over 15lbs, and must avoid strenuous activity, running, repetitive bending or twisting, or similar activities.  After this initial recovery period, she can return to full range of activities including heavy lifting.    If she can not return to work on light-duty restrictions, then I will need to keep her off of work for the entire 3 weeks to avoid tearing her incision.    If there are any questions or concerns, please contact my office at [phone].    Thank you.      Trenton Becker MD  General and Vascular Surgery  [facility]  [phone]

## 2018-03-15 NOTE — PROGRESS NOTES
Patient arrived on unit from PACU.  Assumed care of patient.  Patient states pain in abdomen is 4/10.  No complaints of nausea or vomiting.  Lap stabs approximated, no drainage noted.  Patient on regular diet.  Ok to SL when taking adequate PO per Dr. Becker.

## 2018-03-15 NOTE — OP REPORT
General Surgery Operative Report      Date of Service: 3/15/2018    Patient Name: Nisha Keating    Patient MRN:  [id number]    --------------------------------------------------------------------------------    Preoperative Diagnosis:  1) Gallstone pancreatitis    Postoperative Diagnosis:  Same      Procedure Performed:  1) Laparoscopic cholecystectomy      --------------------------------------------------------------------------------    Surgeon:  Trenton Becker MD    Assistant:  Moshe PRIDE    Anesthesia:  GETA, and local anesthetic    IVF:   Per anesthesia report    UOP:   No edmonds    Est. Blood Loss: minimal     Drains:  none     Specimens:  gallbladder for permanent pathology    Findings:  Moderate inflammatory adhesions to surface of gallbladder    Complications: none     Disposition:  PACU in stable condition    --------------------------------------------------------------------------------    Indications:      Nisha Keating is a 27 y.o. female with abdominal pain, likely attributed to gallstone pancreatitis. She underwent ERCP with stone extraction.  An extensive PARQ conference was held with the patient in regard to laparoscopic cholecystectomy to decrease the risk of recurrent episodes.  The patient was made aware of the alternatives, including operative and non-operative management. The risks of bleeding, infection, damage to surrounding structures, need for reoperation, bile duct injury, stroke, MI, and death were discussed with the patient. The patient was given a chance to ask questions, and all questions were answered to their satisfaction. The patient demonstrated adequate understanding, seemed pleased with the plan, and wished to proceed.     Procedure in detail:  The patient was brought to the operating suite, placed supine on the operating table and general anesthesia was administered.  The patient's abdomen was prepped and draped in the sterile fashion using ChloraPrep.  A  surgical time-out was called to identify the correct patient, procedure and equipment and everyone was in an agreement.    Procedure began with infiltration of local anesthetic at the umbilicus and then a small incision was made.  The subcutaneous tissues were divided until  the fascia was encountered and then this was grasped with a Kocher clamp and a Veress needle was inserted.  Aspiration and saline drop test was normal and then the abdomen was insufflated to a pressure of 15.  The Veress needle was removed and a 5-mm trocar was inserted at the umbilicus.  The patient was positioned in a reverse Trendelenburg right side up position and the 5 mm camera was inserted to inspect the abdomen.  There was no evidence of trauma from insertion of the Veress needle or trocar. There was no evidence of a diffuse inflammatory process.  There were no masses and no abnormalities identified.  The gallbladder had moderate adhesions to the omentum.    An 11mm trocar was inserted in the midline superior epigastric region, and two 5mm ports were inserted in the right upper quadrant, all under direct visualization.    The cystic  triangle was then carefully dissected using electrocautery to score the peritoneal veil and then the cystic duct was circumferentially dissected.  At this point, a clip applier was used to place 3 clips on the proximal cystic duct and one on the distal and then the duct was transected.  Further dissection was carried out until the cystic artery was identified.  It was dissected circumferentially, clipped twice proximally, once distally, and then transected.  Further dissection was carried out and no additional critical structures were identified and therefore the gallbladder was removed from the undersurface of the liver using electrocautery.  There was no blood loss or spillage of bile during this part of the procedure.    The gallbladder was placed in an endocatch bag and brought out through the epigastric  port site.  The epigastric port site fascia was closed with an 0-vicryl suture.  All of the other ports were removed under direct visualization and no bleeding was seen.  Skin at all port sites was closed with subcuticular absorbable suture and then the incisions were covered with skin glue.     The patient tolerated the procedure well.  All counts were correct.  She was extubated and sent to recovery in stable condition.    Trenton Becker MD  [facility]  [phone]  Cell: [phone]

## 2018-03-15 NOTE — OR NURSING
Report called to Jo Ann TAYLOR. Plan of care discussed. Patient reports tolerable 3/10 abdominal discomfort. No complaints of nausea. Patient's HR remains 115-118 at rest, Dr Maya notified, no new orders. OK for patient to proceed to room. VSS. Abdomen soft. Dressings CDI with ice pack in place.

## 2018-03-15 NOTE — CARE PLAN
Problem: Infection  Goal: Will remain free from infection  Outcome: PROGRESSING AS EXPECTED  Patient receiving IV zosyn per MAR.      Problem: Venous Thromboembolism (VTW)/Deep Vein Thrombosis (DVT) Prevention:  Goal: Patient will participate in Venous Thrombosis (VTE)/Deep Vein Thrombosis (DVT)Prevention Measures  Outcome: PROGRESSING AS EXPECTED  Patient has SCDs in place for DVT prophylaxis.

## 2018-03-15 NOTE — PROGRESS NOTES
Received report from evening RN.  Assumed care of patient.  Patient a&Ox4.  C/O pain 4/10, generalized soreness.  Declines medication at this time.  No complaints of nausea or vomiting.  NPO for surgery.  +void.  +flatus, No BM.  No complaints of numbness/tingling.  Ambulating with a steady gait.  Plan of care discussed.  Call light within reach.  Bed in lowest position.

## 2018-03-16 LAB
ALBUMIN SERPL BCP-MCNC: 3.4 G/DL (ref 3.2–4.9)
ALBUMIN/GLOB SERPL: 1.3 G/DL
ALP SERPL-CCNC: 233 U/L (ref 30–99)
ALT SERPL-CCNC: 176 U/L (ref 2–50)
ANION GAP SERPL CALC-SCNC: 7 MMOL/L (ref 0–11.9)
AST SERPL-CCNC: 45 U/L (ref 12–45)
BASOPHILS # BLD AUTO: 0.1 % (ref 0–1.8)
BASOPHILS # BLD: 0.02 K/UL (ref 0–0.12)
BILIRUB SERPL-MCNC: 0.7 MG/DL (ref 0.1–1.5)
BUN SERPL-MCNC: 5 MG/DL (ref 8–22)
CALCIUM SERPL-MCNC: 8.7 MG/DL (ref 8.5–10.5)
CHLORIDE SERPL-SCNC: 104 MMOL/L (ref 96–112)
CO2 SERPL-SCNC: 26 MMOL/L (ref 20–33)
CREAT SERPL-MCNC: 0.42 MG/DL (ref 0.5–1.4)
EOSINOPHIL # BLD AUTO: 0 K/UL (ref 0–0.51)
EOSINOPHIL NFR BLD: 0 % (ref 0–6.9)
ERYTHROCYTE [DISTWIDTH] IN BLOOD BY AUTOMATED COUNT: 43.4 FL (ref 35.9–50)
GLOBULIN SER CALC-MCNC: 2.6 G/DL (ref 1.9–3.5)
GLUCOSE SERPL-MCNC: 121 MG/DL (ref 65–99)
HCT VFR BLD AUTO: 33.2 % (ref 37–47)
HGB BLD-MCNC: 10.9 G/DL (ref 12–16)
IMM GRANULOCYTES # BLD AUTO: 0.13 K/UL (ref 0–0.11)
IMM GRANULOCYTES NFR BLD AUTO: 0.9 % (ref 0–0.9)
LYMPHOCYTES # BLD AUTO: 1 K/UL (ref 1–4.8)
LYMPHOCYTES NFR BLD: 6.8 % (ref 22–41)
MCH RBC QN AUTO: 30 PG (ref 27–33)
MCHC RBC AUTO-ENTMCNC: 32.8 G/DL (ref 33.6–35)
MCV RBC AUTO: 91.5 FL (ref 81.4–97.8)
MONOCYTES # BLD AUTO: 0.83 K/UL (ref 0–0.85)
MONOCYTES NFR BLD AUTO: 5.6 % (ref 0–13.4)
NEUTROPHILS # BLD AUTO: 12.79 K/UL (ref 2–7.15)
NEUTROPHILS NFR BLD: 86.6 % (ref 44–72)
NRBC # BLD AUTO: 0 K/UL
NRBC BLD-RTO: 0 /100 WBC
PLATELET # BLD AUTO: 327 K/UL (ref 164–446)
PMV BLD AUTO: 11.5 FL (ref 9–12.9)
POTASSIUM SERPL-SCNC: 4 MMOL/L (ref 3.6–5.5)
PROT SERPL-MCNC: 6 G/DL (ref 6–8.2)
RBC # BLD AUTO: 3.63 M/UL (ref 4.2–5.4)
SODIUM SERPL-SCNC: 137 MMOL/L (ref 135–145)
WBC # BLD AUTO: 14.8 K/UL (ref 4.8–10.8)

## 2018-03-16 PROCEDURE — 700102 HCHG RX REV CODE 250 W/ 637 OVERRIDE(OP): Performed by: INTERNAL MEDICINE

## 2018-03-16 PROCEDURE — 700111 HCHG RX REV CODE 636 W/ 250 OVERRIDE (IP): Performed by: HOSPITALIST

## 2018-03-16 PROCEDURE — 85025 COMPLETE CBC W/AUTO DIFF WBC: CPT

## 2018-03-16 PROCEDURE — 80053 COMPREHEN METABOLIC PANEL: CPT

## 2018-03-16 PROCEDURE — 700105 HCHG RX REV CODE 258: Performed by: HOSPITALIST

## 2018-03-16 PROCEDURE — 99232 SBSQ HOSP IP/OBS MODERATE 35: CPT | Performed by: INTERNAL MEDICINE

## 2018-03-16 PROCEDURE — 770006 HCHG ROOM/CARE - MED/SURG/GYN SEMI*

## 2018-03-16 PROCEDURE — A9270 NON-COVERED ITEM OR SERVICE: HCPCS | Performed by: INTERNAL MEDICINE

## 2018-03-16 PROCEDURE — 36415 COLL VENOUS BLD VENIPUNCTURE: CPT

## 2018-03-16 PROCEDURE — 700102 HCHG RX REV CODE 250 W/ 637 OVERRIDE(OP): Performed by: HOSPITALIST

## 2018-03-16 PROCEDURE — A9270 NON-COVERED ITEM OR SERVICE: HCPCS | Performed by: HOSPITALIST

## 2018-03-16 RX ADMIN — ONDANSETRON HYDROCHLORIDE 4 MG: 2 INJECTION, SOLUTION INTRAMUSCULAR; INTRAVENOUS at 09:28

## 2018-03-16 RX ADMIN — POTASSIUM CHLORIDE 40 MEQ: 1500 TABLET, EXTENDED RELEASE ORAL at 09:16

## 2018-03-16 RX ADMIN — MAGNESIUM HYDROXIDE 30 ML: 400 SUSPENSION ORAL at 09:23

## 2018-03-16 RX ADMIN — PIPERACILLIN SODIUM AND TAZOBACTAM SODIUM 3.38 G: 3; .375 INJECTION, POWDER, FOR SOLUTION INTRAVENOUS at 14:47

## 2018-03-16 RX ADMIN — HEPARIN SODIUM 5000 UNITS: 5000 INJECTION, SOLUTION INTRAVENOUS; SUBCUTANEOUS at 04:38

## 2018-03-16 RX ADMIN — ONDANSETRON HYDROCHLORIDE 4 MG: 2 INJECTION, SOLUTION INTRAMUSCULAR; INTRAVENOUS at 14:42

## 2018-03-16 RX ADMIN — OXYCODONE HYDROCHLORIDE 5 MG: 5 TABLET ORAL at 21:58

## 2018-03-16 RX ADMIN — HEPARIN SODIUM 5000 UNITS: 5000 INJECTION, SOLUTION INTRAVENOUS; SUBCUTANEOUS at 21:57

## 2018-03-16 RX ADMIN — HEPARIN SODIUM 5000 UNITS: 5000 INJECTION, SOLUTION INTRAVENOUS; SUBCUTANEOUS at 14:46

## 2018-03-16 RX ADMIN — OXYCODONE HYDROCHLORIDE 10 MG: 10 TABLET ORAL at 09:16

## 2018-03-16 RX ADMIN — POTASSIUM CHLORIDE 40 MEQ: 1500 TABLET, EXTENDED RELEASE ORAL at 21:57

## 2018-03-16 RX ADMIN — POLYETHYLENE GLYCOL 3350 1 PACKET: 17 POWDER, FOR SOLUTION ORAL at 09:23

## 2018-03-16 RX ADMIN — PIPERACILLIN SODIUM AND TAZOBACTAM SODIUM 3.38 G: 3; .375 INJECTION, POWDER, FOR SOLUTION INTRAVENOUS at 05:00

## 2018-03-16 RX ADMIN — OXYCODONE HYDROCHLORIDE 10 MG: 10 TABLET ORAL at 04:04

## 2018-03-16 RX ADMIN — SODIUM CHLORIDE: 9 INJECTION, SOLUTION INTRAVENOUS at 21:58

## 2018-03-16 RX ADMIN — STANDARDIZED SENNA CONCENTRATE AND DOCUSATE SODIUM 2 TABLET: 8.6; 5 TABLET, FILM COATED ORAL at 09:16

## 2018-03-16 RX ADMIN — OXYCODONE HYDROCHLORIDE 10 MG: 10 TABLET ORAL at 14:47

## 2018-03-16 RX ADMIN — PIPERACILLIN SODIUM AND TAZOBACTAM SODIUM 3.38 G: 3; .375 INJECTION, POWDER, FOR SOLUTION INTRAVENOUS at 21:58

## 2018-03-16 RX ADMIN — STANDARDIZED SENNA CONCENTRATE AND DOCUSATE SODIUM 2 TABLET: 8.6; 5 TABLET, FILM COATED ORAL at 21:58

## 2018-03-16 ASSESSMENT — ENCOUNTER SYMPTOMS
VOMITING: 0
NECK PAIN: 0
BACK PAIN: 0
INSOMNIA: 0
DEPRESSION: 0
PALPITATIONS: 0
NAUSEA: 0
HEADACHES: 0
ABDOMINAL PAIN: 1
CHILLS: 0
BLURRED VISION: 0
EYE PAIN: 0
DIZZINESS: 0
COUGH: 0
TINGLING: 0
FEVER: 0
SHORTNESS OF BREATH: 0
SORE THROAT: 0

## 2018-03-16 ASSESSMENT — PAIN SCALES - GENERAL
PAINLEVEL_OUTOF10: 4
PAINLEVEL_OUTOF10: 4
PAINLEVEL_OUTOF10: 8
PAINLEVEL_OUTOF10: 6
PAINLEVEL_OUTOF10: 5
PAINLEVEL_OUTOF10: 7

## 2018-03-16 NOTE — CARE PLAN
Problem: Discharge Barriers/Planning  Goal: Patient's continuum of care needs will be met    Intervention: Assess potential discharge barriers on admission and throughout hospital stay  [place] need to verify pharmacy availabilities and ability to follow up with appointments. Pt locating information, will confirm before end of shift.

## 2018-03-16 NOTE — PROGRESS NOTES
Assumed care at 0700. Received report from night shift RN. Bedside report completed. AOx4.    C/o pain and nausea-medicated for both per MAR. +voiding. -BM    Ambulating with steady gait. Pt call light and belongings within reach, fall precautions in place. Family at bedside

## 2018-03-16 NOTE — PROGRESS NOTES
[facility] Hospitalist Progress Note    Date of Service: 3/16/2018    Chief Complaint  27 y.o. female admitted 3/12/2018 with abodminal pain found to have gallstone pancreatitis.     Interval Problem Update  Pt seen and examined  had ERCP done 3/14/18,had cholecystectomy done on 3/15/18.  Still having some abdominal pain, afebrile.  No overnight events.       Consultants/Specialty  GI: Dr. Ramirez  Surgery: Dr. Rosita Gar  TBD     Ultrasound of gallbladder shows cholelithiasis with dilated common bile duct findings may represent choledocholithiasis are recently passed common duct stone with residual common duct enlargement.        Review of Systems   Constitutional: Negative for chills and fever.   HENT: Negative for sore throat.    Eyes: Negative for blurred vision and pain.   Respiratory: Negative for cough and shortness of breath.    Cardiovascular: Negative for chest pain and palpitations.   Gastrointestinal: Positive for abdominal pain. Negative for nausea and vomiting.   Genitourinary: Negative for dysuria and urgency.   Musculoskeletal: Negative for back pain and neck pain.   Skin: Negative for itching and rash.   Neurological: Negative for dizziness, tingling and headaches.   Psychiatric/Behavioral: Negative for depression. The patient does not have insomnia.    All other systems reviewed and are negative.     Physical Exam  Laboratory/Imaging   Hemodynamics  Temp (24hrs), Av.7 °C (98.1 °F), Min:36.1 °C (97 °F), Max:37.5 °C (99.5 °F)   Temperature: 36.1 °C (97 °F)  Pulse  Av.4  Min: 64  Max: 126 Heart Rate (Monitored): (!) 114  Blood Pressure: 106/76, NIBP: 105/64      Respiratory      Respiration: 16, Pulse Oximetry: 93 %             Fluids    Intake/Output Summary (Last 24 hours) at 18 1352  Last data filed at 18 1100   Gross per 24 hour   Intake              100 ml   Output             1000 ml   Net             -900 ml       Nutrition  Orders Placed This Encounter    Procedures   • DIET ORDER     Standing Status:   Standing     Number of Occurrences:   1     Order Specific Question:   Diet:     Answer:   Regular [1]     Physical Exam   Constitutional: She is oriented to person, place, and time. She appears well-developed and well-nourished. No distress.   HENT:   Right Ear: External ear normal.   Left Ear: External ear normal.   Nose: Nose normal.   Eyes: Conjunctivae are normal. Right eye exhibits no discharge. Left eye exhibits no discharge.   Neck: No JVD present.   Cardiovascular: Regular rhythm and normal heart sounds.    No murmur heard.  Cap refill 2sec  Pulses 2+ throughout  Normal skin  Color.    Pulmonary/Chest: Effort normal and breath sounds normal. No stridor. No respiratory distress. She has no wheezes. She has no rales.   Abdominal: Soft. Bowel sounds are normal. She exhibits no distension. There is tenderness.   Musculoskeletal: She exhibits no edema or tenderness.   Neurological: She is alert and oriented to person, place, and time.   Skin: Skin is warm and dry. She is not diaphoretic. No erythema.   Psychiatric: She has a normal mood and affect. Her behavior is normal.   Nursing note and vitals reviewed.      Recent Labs      03/14/18   0240  03/15/18   0021  03/16/18   0100   WBC  14.4*  14.0*  14.8*   RBC  3.81*  3.61*  3.63*   HEMOGLOBIN  11.3*  10.9*  10.9*   HEMATOCRIT  35.9*  33.4*  33.2*   MCV  94.2  92.5  91.5   MCH  29.7  30.2  30.0   MCHC  31.5*  32.6*  32.8*   RDW  46.1  45.0  43.4   PLATELETCT  269  291  327   MPV  11.4  11.6  11.5     Recent Labs      03/14/18   0240  03/15/18   0021  03/16/18   0100   SODIUM  140  140  137   POTASSIUM  3.7  3.0*  4.0   CHLORIDE  108  109  104   CO2  22  24  26   GLUCOSE  111*  100*  121*   BUN  8  7*  5*   CREATININE  0.41*  0.40*  0.42*   CALCIUM  8.7  8.0*  8.7                      Assessment/Plan     * Gallstone pancreatitis   Assessment & Plan    IVF   Anti emetics  Pain control   Had ERCP done on 3/14/18 ,  gi following appreciate rec.   Had cholecystectomy done on 3/15/18 , surgery following appreciate rec.           Cholecystitis   Assessment & Plan    Surgery following, had cholecystectomy done on 3/15/18     Appreciate rec.    cont IV abx               Sepsis (CMS-[facility])   Assessment & Plan    This is sepsis (without associated acute organ dysfunction).   2/2 cholecystitis likely  Cont on IVF and IV abx             Choledocholithiasis   Assessment & Plan    Noted on outside ultrasound.   With common bile duct enlargement   MRCP also positive for choledocholithiasis   Had ERCP done 3/14/18           Quality-Core Measures   Reviewed items::  EKG reviewed, Radiology images reviewed, Labs reviewed and Medications reviewed  Leon catheter::  No Leon  DVT prophylaxis pharmacological::  Heparin  DVT prophylaxis - mechanical:  SCDs  Ulcer Prophylaxis::  Yes  Antibiotics:  Treating active infection/contamination beyond 24 hours perioperative coverage  Assessed for rehabilitation services:  Patient was assess for and/or received rehabilitation services during this hospitalization

## 2018-03-17 LAB
ALBUMIN SERPL BCP-MCNC: 3.3 G/DL (ref 3.2–4.9)
ALBUMIN/GLOB SERPL: 1.2 G/DL
ALP SERPL-CCNC: 176 U/L (ref 30–99)
ALT SERPL-CCNC: 138 U/L (ref 2–50)
ANION GAP SERPL CALC-SCNC: 8 MMOL/L (ref 0–11.9)
AST SERPL-CCNC: 29 U/L (ref 12–45)
BILIRUB SERPL-MCNC: 0.7 MG/DL (ref 0.1–1.5)
BUN SERPL-MCNC: 8 MG/DL (ref 8–22)
CALCIUM SERPL-MCNC: 8.9 MG/DL (ref 8.5–10.5)
CHLORIDE SERPL-SCNC: 101 MMOL/L (ref 96–112)
CO2 SERPL-SCNC: 26 MMOL/L (ref 20–33)
CREAT SERPL-MCNC: 0.48 MG/DL (ref 0.5–1.4)
ERYTHROCYTE [DISTWIDTH] IN BLOOD BY AUTOMATED COUNT: 42.9 FL (ref 35.9–50)
GLOBULIN SER CALC-MCNC: 2.8 G/DL (ref 1.9–3.5)
GLUCOSE SERPL-MCNC: 92 MG/DL (ref 65–99)
HCT VFR BLD AUTO: 35.6 % (ref 37–47)
HGB BLD-MCNC: 11.5 G/DL (ref 12–16)
MCH RBC QN AUTO: 29.7 PG (ref 27–33)
MCHC RBC AUTO-ENTMCNC: 32.3 G/DL (ref 33.6–35)
MCV RBC AUTO: 92 FL (ref 81.4–97.8)
PLATELET # BLD AUTO: 380 K/UL (ref 164–446)
PMV BLD AUTO: 11.2 FL (ref 9–12.9)
POTASSIUM SERPL-SCNC: 4.1 MMOL/L (ref 3.6–5.5)
PROT SERPL-MCNC: 6.1 G/DL (ref 6–8.2)
RBC # BLD AUTO: 3.87 M/UL (ref 4.2–5.4)
SODIUM SERPL-SCNC: 135 MMOL/L (ref 135–145)
WBC # BLD AUTO: 15.5 K/UL (ref 4.8–10.8)

## 2018-03-17 PROCEDURE — 99232 SBSQ HOSP IP/OBS MODERATE 35: CPT | Performed by: INTERNAL MEDICINE

## 2018-03-17 PROCEDURE — 700111 HCHG RX REV CODE 636 W/ 250 OVERRIDE (IP): Performed by: HOSPITALIST

## 2018-03-17 PROCEDURE — 80053 COMPREHEN METABOLIC PANEL: CPT

## 2018-03-17 PROCEDURE — A9270 NON-COVERED ITEM OR SERVICE: HCPCS | Performed by: HOSPITALIST

## 2018-03-17 PROCEDURE — 85027 COMPLETE CBC AUTOMATED: CPT

## 2018-03-17 PROCEDURE — 700102 HCHG RX REV CODE 250 W/ 637 OVERRIDE(OP): Performed by: HOSPITALIST

## 2018-03-17 PROCEDURE — A9270 NON-COVERED ITEM OR SERVICE: HCPCS | Performed by: INTERNAL MEDICINE

## 2018-03-17 PROCEDURE — 770006 HCHG ROOM/CARE - MED/SURG/GYN SEMI*

## 2018-03-17 PROCEDURE — 700102 HCHG RX REV CODE 250 W/ 637 OVERRIDE(OP): Performed by: INTERNAL MEDICINE

## 2018-03-17 PROCEDURE — 36415 COLL VENOUS BLD VENIPUNCTURE: CPT

## 2018-03-17 PROCEDURE — 700105 HCHG RX REV CODE 258: Performed by: HOSPITALIST

## 2018-03-17 RX ADMIN — PIPERACILLIN SODIUM AND TAZOBACTAM SODIUM 3.38 G: 3; .375 INJECTION, POWDER, FOR SOLUTION INTRAVENOUS at 21:28

## 2018-03-17 RX ADMIN — PIPERACILLIN SODIUM AND TAZOBACTAM SODIUM 3.38 G: 3; .375 INJECTION, POWDER, FOR SOLUTION INTRAVENOUS at 05:00

## 2018-03-17 RX ADMIN — POTASSIUM CHLORIDE 40 MEQ: 1500 TABLET, EXTENDED RELEASE ORAL at 09:09

## 2018-03-17 RX ADMIN — HEPARIN SODIUM 5000 UNITS: 5000 INJECTION, SOLUTION INTRAVENOUS; SUBCUTANEOUS at 05:00

## 2018-03-17 RX ADMIN — OXYCODONE HYDROCHLORIDE 5 MG: 5 TABLET ORAL at 05:00

## 2018-03-17 RX ADMIN — PIPERACILLIN SODIUM AND TAZOBACTAM SODIUM 3.38 G: 3; .375 INJECTION, POWDER, FOR SOLUTION INTRAVENOUS at 13:34

## 2018-03-17 RX ADMIN — OXYCODONE HYDROCHLORIDE 5 MG: 5 TABLET ORAL at 23:11

## 2018-03-17 RX ADMIN — HEPARIN SODIUM 5000 UNITS: 5000 INJECTION, SOLUTION INTRAVENOUS; SUBCUTANEOUS at 13:34

## 2018-03-17 RX ADMIN — HEPARIN SODIUM 5000 UNITS: 5000 INJECTION, SOLUTION INTRAVENOUS; SUBCUTANEOUS at 21:28

## 2018-03-17 ASSESSMENT — LIFESTYLE VARIABLES: DO YOU DRINK ALCOHOL: NO

## 2018-03-17 ASSESSMENT — ENCOUNTER SYMPTOMS
NECK PAIN: 0
BACK PAIN: 0
PALPITATIONS: 0
HEADACHES: 0
FEVER: 0
SHORTNESS OF BREATH: 0
EYE PAIN: 0
NAUSEA: 0
CHILLS: 0
ABDOMINAL PAIN: 1
INSOMNIA: 0
DIZZINESS: 0
COUGH: 0
BLURRED VISION: 0
VOMITING: 0
TINGLING: 0
DEPRESSION: 0
SORE THROAT: 0

## 2018-03-17 ASSESSMENT — PAIN SCALES - GENERAL
PAINLEVEL_OUTOF10: 0
PAINLEVEL_OUTOF10: 6
PAINLEVEL_OUTOF10: 4
PAINLEVEL_OUTOF10: 0

## 2018-03-17 NOTE — CARE PLAN
Problem: Infection  Goal: Will remain free from infection  Outcome: PROGRESSING AS EXPECTED  Monitor rising WBC, give antbx as ordered    Problem: Venous Thromboembolism (VTW)/Deep Vein Thrombosis (DVT) Prevention:  Goal: Patient will participate in Venous Thrombosis (VTE)/Deep Vein Thrombosis (DVT)Prevention Measures  Outcome: PROGRESSING AS EXPECTED  Administer heparin as ordered, continue to encourage ambulation

## 2018-03-17 NOTE — CARE PLAN
Problem: Safety  Goal: Will remain free from falls  Outcome: PROGRESSING AS EXPECTED  Pt remains free from fall at this time.  Pt educated on fall risk and demonstrates understanding by appropriate use of call light to call for assistance.  CLIP, hourly rounding in place    Problem: Venous Thromboembolism (VTW)/Deep Vein Thrombosis (DVT) Prevention:  Goal: Patient will participate in Venous Thrombosis (VTE)/Deep Vein Thrombosis (DVT)Prevention Measures  Outcome: PROGRESSING AS EXPECTED   03/16/18 2150   Mechanical/VTE Prophylaxis   Mechanical Prophylaxis  SCDs, Sequential Compression Device   SCDs, Sequential Compression Device Refused   OTHER   Risk Assessment Score 2   VTE RISK Moderate   Pharmacologic Prophylaxis Used Unfractionated Heparin

## 2018-03-17 NOTE — PROGRESS NOTES
[facility] Hospitalist Progress Note    Date of Service: 3/17/2018    Chief Complaint  27 y.o. female admitted 3/12/2018 with abodminal pain found to have gallstone pancreatitis.     Interval Problem Update   No overnight events.   had ERCP done 3/14/18,had cholecystectomy done on 3/15/18.  Still having some abdominal pain, afebrile.  WBC trending up will monitor       Consultants/Specialty  GI: Dr. Ramirez  Surgery: Dr. Becker     Disposition  TBD     Ultrasound of gallbladder shows cholelithiasis with dilated common bile duct findings may represent choledocholithiasis are recently passed common duct stone with residual common duct enlargement.        Review of Systems   Constitutional: Negative for chills and fever.   HENT: Negative for sore throat.    Eyes: Negative for blurred vision and pain.   Respiratory: Negative for cough and shortness of breath.    Cardiovascular: Negative for chest pain and palpitations.   Gastrointestinal: Positive for abdominal pain. Negative for nausea and vomiting.   Genitourinary: Negative for dysuria and urgency.   Musculoskeletal: Negative for back pain and neck pain.   Skin: Negative for itching and rash.   Neurological: Negative for dizziness, tingling and headaches.   Psychiatric/Behavioral: Negative for depression. The patient does not have insomnia.    All other systems reviewed and are negative.     Physical Exam  Laboratory/Imaging   Hemodynamics  Temp (24hrs), Av.6 °C (97.8 °F), Min:36.1 °C (97 °F), Max:37 °C (98.6 °F)   Temperature: 37 °C (98.6 °F)  Pulse  Av.6  Min: 60  Max: 126   Blood Pressure: 103/65      Respiratory      Respiration: 17, Pulse Oximetry: 91 %             Fluids    Intake/Output Summary (Last 24 hours) at 18 1035  Last data filed at 18 0400   Gross per 24 hour   Intake             1210 ml   Output                0 ml   Net             1210 ml       Nutrition  Orders Placed This Encounter   Procedures   • DIET ORDER     Standing Status:    Standing     Number of Occurrences:   1     Order Specific Question:   Diet:     Answer:   Regular [1]     Physical Exam   Constitutional: She is oriented to person, place, and time. She appears well-developed and well-nourished. No distress.   HENT:   Right Ear: External ear normal.   Left Ear: External ear normal.   Nose: Nose normal.   Eyes: Conjunctivae are normal. Right eye exhibits no discharge. Left eye exhibits no discharge.   Neck: No JVD present.   Cardiovascular: Regular rhythm and normal heart sounds.    No murmur heard.  Cap refill 2sec  Pulses 2+ throughout  Normal skin  Color.    Pulmonary/Chest: Effort normal and breath sounds normal. No stridor. No respiratory distress. She has no wheezes. She has no rales.   Abdominal: Soft. Bowel sounds are normal. She exhibits no distension. There is tenderness.   Musculoskeletal: She exhibits no edema or tenderness.   Neurological: She is alert and oriented to person, place, and time.   Skin: Skin is warm and dry. She is not diaphoretic. No erythema.   Psychiatric: She has a normal mood and affect. Her behavior is normal.   Nursing note and vitals reviewed.      Recent Labs      03/15/18   0021  03/16/18   0100  03/17/18   0223   WBC  14.0*  14.8*  15.5*   RBC  3.61*  3.63*  3.87*   HEMOGLOBIN  10.9*  10.9*  11.5*   HEMATOCRIT  33.4*  33.2*  35.6*   MCV  92.5  91.5  92.0   MCH  30.2  30.0  29.7   MCHC  32.6*  32.8*  32.3*   RDW  45.0  43.4  42.9   PLATELETCT  291  327  380   MPV  11.6  11.5  11.2     Recent Labs      03/15/18   0021  03/16/18   0100  03/17/18   0223   SODIUM  140  137  135   POTASSIUM  3.0*  4.0  4.1   CHLORIDE  109  104  101   CO2  24  26  26   GLUCOSE  100*  121*  92   BUN  7*  5*  8   CREATININE  0.40*  0.42*  0.48*   CALCIUM  8.0*  8.7  8.9                      Assessment/Plan     * Gallstone pancreatitis   Assessment & Plan    IVF   Anti emetics  Pain control   Had ERCP done on 3/14/18 , gi following appreciate rec.   Had cholecystectomy  done on 3/15/18 , surgery following appreciate rec.           Cholecystitis   Assessment & Plan    Surgery following, had cholecystectomy done on 3/15/18     Appreciate rec.    cont IV abx               Sepsis (CMS-[facility])   Assessment & Plan    This is sepsis (without associated acute organ dysfunction).   2/2 cholecystitis likely  Cont on IVF and IV abx             Choledocholithiasis   Assessment & Plan    Noted on outside ultrasound.   With common bile duct enlargement   MRCP also positive for choledocholithiasis   Had ERCP done 3/14/18           Quality-Core Measures   Reviewed items::  EKG reviewed, Radiology images reviewed, Labs reviewed and Medications reviewed  Leon catheter::  No Leon  DVT prophylaxis pharmacological::  Heparin  DVT prophylaxis - mechanical:  SCDs  Ulcer Prophylaxis::  Yes  Antibiotics:  Treating active infection/contamination beyond 24 hours perioperative coverage  Assessed for rehabilitation services:  Patient was assess for and/or received rehabilitation services during this hospitalization

## 2018-03-17 NOTE — PROGRESS NOTES
Assumed care of Ms Brandon Keating at 1900.    Pt is A&O x4.  Pain 5/10.  Pt medicated per MAR  Nausea denied  Tolerating Diet   Abdominal lap sites, open to air, dermabond, clean dry intact  Positive Urine output  Positive BM Void   Positive Flatus  Up Self   Bed in lowest position and locked.  Pt resting comfortably now.  Review plan of care with patient  Call light within reach  Hourly rounds in place  All needs met at this time

## 2018-03-18 ENCOUNTER — APPOINTMENT (OUTPATIENT)
Dept: RADIOLOGY | Facility: MEDICAL CENTER | Age: 28
DRG: 853 | End: 2018-03-18
Attending: INTERNAL MEDICINE
Payer: COMMERCIAL

## 2018-03-18 PROBLEM — D72.829 LEUKOCYTOSIS: Status: ACTIVE | Noted: 2018-03-18

## 2018-03-18 LAB
ALBUMIN SERPL BCP-MCNC: 3.7 G/DL (ref 3.2–4.9)
ALBUMIN/GLOB SERPL: 1.4 G/DL
ALP SERPL-CCNC: 171 U/L (ref 30–99)
ALT SERPL-CCNC: 101 U/L (ref 2–50)
ANION GAP SERPL CALC-SCNC: 9 MMOL/L (ref 0–11.9)
AST SERPL-CCNC: 17 U/L (ref 12–45)
BILIRUB SERPL-MCNC: 0.7 MG/DL (ref 0.1–1.5)
BUN SERPL-MCNC: 8 MG/DL (ref 8–22)
CALCIUM SERPL-MCNC: 9.5 MG/DL (ref 8.5–10.5)
CHLORIDE SERPL-SCNC: 101 MMOL/L (ref 96–112)
CO2 SERPL-SCNC: 23 MMOL/L (ref 20–33)
CREAT SERPL-MCNC: 0.54 MG/DL (ref 0.5–1.4)
ERYTHROCYTE [DISTWIDTH] IN BLOOD BY AUTOMATED COUNT: 43.4 FL (ref 35.9–50)
GLOBULIN SER CALC-MCNC: 2.7 G/DL (ref 1.9–3.5)
GLUCOSE SERPL-MCNC: 103 MG/DL (ref 65–99)
HCT VFR BLD AUTO: 37.9 % (ref 37–47)
HGB BLD-MCNC: 12.1 G/DL (ref 12–16)
MCH RBC QN AUTO: 29.7 PG (ref 27–33)
MCHC RBC AUTO-ENTMCNC: 31.9 G/DL (ref 33.6–35)
MCV RBC AUTO: 92.9 FL (ref 81.4–97.8)
PLATELET # BLD AUTO: 423 K/UL (ref 164–446)
PMV BLD AUTO: 10.8 FL (ref 9–12.9)
POTASSIUM SERPL-SCNC: 4.2 MMOL/L (ref 3.6–5.5)
PROT SERPL-MCNC: 6.4 G/DL (ref 6–8.2)
RBC # BLD AUTO: 4.08 M/UL (ref 4.2–5.4)
SODIUM SERPL-SCNC: 133 MMOL/L (ref 135–145)
WBC # BLD AUTO: 17.4 K/UL (ref 4.8–10.8)

## 2018-03-18 PROCEDURE — 87040 BLOOD CULTURE FOR BACTERIA: CPT

## 2018-03-18 PROCEDURE — 74177 CT ABD & PELVIS W/CONTRAST: CPT

## 2018-03-18 PROCEDURE — 700105 HCHG RX REV CODE 258: Performed by: HOSPITALIST

## 2018-03-18 PROCEDURE — 700117 HCHG RX CONTRAST REV CODE 255: Performed by: INTERNAL MEDICINE

## 2018-03-18 PROCEDURE — 770006 HCHG ROOM/CARE - MED/SURG/GYN SEMI*

## 2018-03-18 PROCEDURE — 700111 HCHG RX REV CODE 636 W/ 250 OVERRIDE (IP): Performed by: HOSPITALIST

## 2018-03-18 PROCEDURE — 80053 COMPREHEN METABOLIC PANEL: CPT

## 2018-03-18 PROCEDURE — 36415 COLL VENOUS BLD VENIPUNCTURE: CPT

## 2018-03-18 PROCEDURE — 99232 SBSQ HOSP IP/OBS MODERATE 35: CPT | Performed by: INTERNAL MEDICINE

## 2018-03-18 PROCEDURE — 85027 COMPLETE CBC AUTOMATED: CPT

## 2018-03-18 RX ADMIN — PIPERACILLIN SODIUM AND TAZOBACTAM SODIUM 3.38 G: 3; .375 INJECTION, POWDER, FOR SOLUTION INTRAVENOUS at 21:12

## 2018-03-18 RX ADMIN — PIPERACILLIN SODIUM AND TAZOBACTAM SODIUM 3.38 G: 3; .375 INJECTION, POWDER, FOR SOLUTION INTRAVENOUS at 05:42

## 2018-03-18 RX ADMIN — PIPERACILLIN SODIUM AND TAZOBACTAM SODIUM 3.38 G: 3; .375 INJECTION, POWDER, FOR SOLUTION INTRAVENOUS at 13:33

## 2018-03-18 RX ADMIN — IOHEXOL 100 ML: 350 INJECTION, SOLUTION INTRAVENOUS at 13:11

## 2018-03-18 RX ADMIN — HEPARIN SODIUM 5000 UNITS: 5000 INJECTION, SOLUTION INTRAVENOUS; SUBCUTANEOUS at 13:33

## 2018-03-18 RX ADMIN — HEPARIN SODIUM 5000 UNITS: 5000 INJECTION, SOLUTION INTRAVENOUS; SUBCUTANEOUS at 05:41

## 2018-03-18 ASSESSMENT — ENCOUNTER SYMPTOMS
BACK PAIN: 0
ABDOMINAL PAIN: 1
NECK PAIN: 0
CHILLS: 0
DEPRESSION: 0
TINGLING: 0
EYE PAIN: 0
DIZZINESS: 0
COUGH: 0
PALPITATIONS: 0
SHORTNESS OF BREATH: 0
BLURRED VISION: 0
INSOMNIA: 0
FEVER: 0
NAUSEA: 0
HEADACHES: 0
VOMITING: 0
SORE THROAT: 0

## 2018-03-18 ASSESSMENT — LIFESTYLE VARIABLES: DO YOU DRINK ALCOHOL: NO

## 2018-03-18 ASSESSMENT — PAIN SCALES - GENERAL
PAINLEVEL_OUTOF10: 0
PAINLEVEL_OUTOF10: 0

## 2018-03-18 NOTE — PROGRESS NOTES
Surgery    Clinically doing well  Minimal pain  Tolerating PO  Wants to go home  AFVSS  Abdomen soft, minimally tender, no R/G, incisions CDI    WBC 14 -> 15 -> 17 today, while on zosyn  Bili normal  ALT and Alkphos trending down to normal.    CT pending    A/P)  3/15: Lap Diana  Clinically looks fine  Unclear to me why the WBC is trending up.  Will follow CT abd/pelvis and see if anything needs to be done.    Trenton Becker MD  General&Vascular Surgery  [facility]  Cell: [phone]  Office: [phone]

## 2018-03-18 NOTE — PROGRESS NOTES
0645 Received report, introduced self to pt, reviewed labs, orders, allergies, code status    0855 informed MD Maria of pt's low bp, as well as discussed higher WBC. Pt is not febrile or dizzy, or tachycardic. MD states he will order abdominal CT with contrast. This RN will obtain written consent for contrast    1100 pt ambulating around unit, steady gait. Family is here visiting. Abdominal incisions healing beautifully.

## 2018-03-18 NOTE — CARE PLAN
Problem: Venous Thromboembolism (VTW)/Deep Vein Thrombosis (DVT) Prevention:  Goal: Patient will participate in Venous Thrombosis (VTE)/Deep Vein Thrombosis (DVT)Prevention Measures   03/17/18 4685   Mechanical/VTE Prophylaxis   Mechanical Prophylaxis  SCDs, Sequential Compression Device   SCDs, Sequential Compression Device Refused   OTHER   Risk Assessment Score 2   VTE RISK Moderate   Pharmacologic Prophylaxis Used Unfractionated Heparin

## 2018-03-18 NOTE — ASSESSMENT & PLAN NOTE
Wbc still up on 17 today.   Cont on zosyn  CT abdomen showed some residual pancreatic inflammation  Bcx pending, will also check a UA/Ucx and CXr  Monitor

## 2018-03-18 NOTE — PROGRESS NOTES
[facility] Hospitalist Progress Note    Date of Service: 3/18/2018    Chief Complaint  27 y.o. female admitted 3/12/2018 with abodminal pain found to have gallstone pancreatitis.     Interval Problem Update   No overnight events.   had ERCP done 3/14/18,had cholecystectomy done on 3/15/18.    WBC continues to trend up now 17 from 15 yesterday, afebrile otherwise, will check a CT abd/pelv.  Surgery also following appreciate rec.       Consultants/Specialty  GI: Dr. Ramirez  Surgery: Dr. Rosita Gar  TBD     Ultrasound of gallbladder shows cholelithiasis with dilated common bile duct findings may represent choledocholithiasis are recently passed common duct stone with residual common duct enlargement.        Review of Systems   Constitutional: Negative for chills and fever.   HENT: Negative for sore throat.    Eyes: Negative for blurred vision and pain.   Respiratory: Negative for cough and shortness of breath.    Cardiovascular: Negative for chest pain and palpitations.   Gastrointestinal: Positive for abdominal pain. Negative for nausea and vomiting.   Genitourinary: Negative for dysuria and urgency.   Musculoskeletal: Negative for back pain and neck pain.   Skin: Negative for itching and rash.   Neurological: Negative for dizziness, tingling and headaches.   Psychiatric/Behavioral: Negative for depression. The patient does not have insomnia.    All other systems reviewed and are negative.     Physical Exam  Laboratory/Imaging   Hemodynamics  Temp (24hrs), Av.6 °C (97.9 °F), Min:36.1 °C (97 °F), Max:37.3 °C (99.2 °F)   Temperature: 36.9 °C (98.4 °F)  Pulse  Av  Min: 60  Max: 126   Blood Pressure: (!) 88/55 (nurse notified)      Respiratory      Respiration: 16, Pulse Oximetry: 95 %             Fluids    Intake/Output Summary (Last 24 hours) at 18 1138  Last data filed at 18 0400   Gross per 24 hour   Intake             1080 ml   Output                0 ml   Net             1080 ml        Nutrition  Orders Placed This Encounter   Procedures   • DIET ORDER     Standing Status:   Standing     Number of Occurrences:   1     Order Specific Question:   Diet:     Answer:   Regular [1]     Physical Exam   Constitutional: She is oriented to person, place, and time. She appears well-developed and well-nourished. No distress.   HENT:   Right Ear: External ear normal.   Left Ear: External ear normal.   Nose: Nose normal.   Eyes: Conjunctivae are normal. Right eye exhibits no discharge. Left eye exhibits no discharge.   Neck: No JVD present.   Cardiovascular: Regular rhythm and normal heart sounds.    No murmur heard.  Cap refill 2sec  Pulses 2+ throughout  Normal skin  Color.    Pulmonary/Chest: Effort normal and breath sounds normal. No stridor. No respiratory distress. She has no wheezes. She has no rales.   Abdominal: Soft. Bowel sounds are normal. She exhibits no distension. There is tenderness.   Musculoskeletal: She exhibits no edema or tenderness.   Neurological: She is alert and oriented to person, place, and time.   Skin: Skin is warm and dry. She is not diaphoretic. No erythema.   Psychiatric: She has a normal mood and affect. Her behavior is normal.   Nursing note and vitals reviewed.      Recent Labs      03/16/18   0100  03/17/18   0223  03/18/18   0043   WBC  14.8*  15.5*  17.4*   RBC  3.63*  3.87*  4.08*   HEMOGLOBIN  10.9*  11.5*  12.1   HEMATOCRIT  33.2*  35.6*  37.9   MCV  91.5  92.0  92.9   MCH  30.0  29.7  29.7   MCHC  32.8*  32.3*  31.9*   RDW  43.4  42.9  43.4   PLATELETCT  327  380  423   MPV  11.5  11.2  10.8     Recent Labs      03/16/18   0100  03/17/18   0223  03/18/18   0043   SODIUM  137  135  133*   POTASSIUM  4.0  4.1  4.2   CHLORIDE  104  101  101   CO2  26  26  23   GLUCOSE  121*  92  103*   BUN  5*  8  8   CREATININE  0.42*  0.48*  0.54   CALCIUM  8.7  8.9  9.5                      Assessment/Plan     * Gallstone pancreatitis   Assessment & Plan    IVF   Anti  emetics  Pain control   Had ERCP done on 3/14/18 , gi following appreciate rec.   Had cholecystectomy done on 3/15/18 , surgery following appreciate rec.           Cholecystitis   Assessment & Plan    Surgery following, had cholecystectomy done on 3/15/18     Appreciate rec.    cont IV abx               Sepsis (CMS-[facility])   Assessment & Plan    This is sepsis (without associated acute organ dysfunction).   2/2 cholecystitis likely  Cont on IVF and IV abx             Leukocytosis   Assessment & Plan    Wbc continues to trend up, afebrile  Cont on zosyn  Will check a CT abdomen and pelvis  Monitor         Choledocholithiasis   Assessment & Plan    Noted on outside ultrasound.   With common bile duct enlargement   MRCP also positive for choledocholithiasis   Had ERCP done 3/14/18           Quality-Core Measures   Reviewed items::  EKG reviewed, Radiology images reviewed, Labs reviewed and Medications reviewed  Leon catheter::  No Leon  DVT prophylaxis pharmacological::  Heparin  DVT prophylaxis - mechanical:  SCDs  Ulcer Prophylaxis::  Yes  Antibiotics:  Treating active infection/contamination beyond 24 hours perioperative coverage  Assessed for rehabilitation services:  Patient was assess for and/or received rehabilitation services during this hospitalization

## 2018-03-18 NOTE — PROGRESS NOTES
Assumed care of Ms Brandon Keating at 1900.    Pt is A&O x4.  Pain denied at this time  Nausea denied  Tolerating Diet   Abdominal lap sites, open to air, dermabond, clean dry intact  Positive Urine output  Positive BM Void   Positive Flatus  Up Self   Bed in lowest position and locked.  Pt resting comfortably now.  Review plan of care with patient  Call light within reach  Hourly rounds in place  All needs met at this time

## 2018-03-18 NOTE — CARE PLAN
Problem: Safety  Goal: Will remain free from falls  Outcome: PROGRESSING AS EXPECTED  Pt remains free from fall at this time.  Pt educated on fall risk and demonstrates understanding by appropriate use of call light to call for assistance.  CLIP, hourly rounding in place

## 2018-03-18 NOTE — CARE PLAN
Problem: Infection  Goal: Will remain free from infection  Outcome: PROGRESSING AS EXPECTED  Monitor incision sites, give antbx as ordered, watch for abd CT results    Problem: Bowel/Gastric:  Goal: Normal bowel function is maintained or improved  Outcome: PROGRESSING AS EXPECTED  Educate on low fat diet, hold stool softeners at pt's request

## 2018-03-19 ENCOUNTER — APPOINTMENT (OUTPATIENT)
Dept: RADIOLOGY | Facility: MEDICAL CENTER | Age: 28
DRG: 853 | End: 2018-03-19
Attending: INTERNAL MEDICINE
Payer: COMMERCIAL

## 2018-03-19 LAB
ALBUMIN SERPL BCP-MCNC: 3.6 G/DL (ref 3.2–4.9)
ALBUMIN/GLOB SERPL: 1.1 G/DL
ALP SERPL-CCNC: 175 U/L (ref 30–99)
ALT SERPL-CCNC: 79 U/L (ref 2–50)
ANION GAP SERPL CALC-SCNC: 11 MMOL/L (ref 0–11.9)
APPEARANCE UR: CLEAR
AST SERPL-CCNC: 14 U/L (ref 12–45)
BASOPHILS # BLD AUTO: 0.4 % (ref 0–1.8)
BASOPHILS # BLD: 0.06 K/UL (ref 0–0.12)
BILIRUB SERPL-MCNC: 0.6 MG/DL (ref 0.1–1.5)
BILIRUB UR QL STRIP.AUTO: NEGATIVE
BUN SERPL-MCNC: 10 MG/DL (ref 8–22)
CALCIUM SERPL-MCNC: 9.5 MG/DL (ref 8.5–10.5)
CHLORIDE SERPL-SCNC: 101 MMOL/L (ref 96–112)
CO2 SERPL-SCNC: 24 MMOL/L (ref 20–33)
COLOR UR: YELLOW
CREAT SERPL-MCNC: 0.56 MG/DL (ref 0.5–1.4)
EOSINOPHIL # BLD AUTO: 0.51 K/UL (ref 0–0.51)
EOSINOPHIL NFR BLD: 3 % (ref 0–6.9)
ERYTHROCYTE [DISTWIDTH] IN BLOOD BY AUTOMATED COUNT: 42.2 FL (ref 35.9–50)
GLOBULIN SER CALC-MCNC: 3.2 G/DL (ref 1.9–3.5)
GLUCOSE SERPL-MCNC: 104 MG/DL (ref 65–99)
GLUCOSE UR STRIP.AUTO-MCNC: NEGATIVE MG/DL
HCT VFR BLD AUTO: 39.8 % (ref 37–47)
HGB BLD-MCNC: 13.1 G/DL (ref 12–16)
IMM GRANULOCYTES # BLD AUTO: 0.34 K/UL (ref 0–0.11)
IMM GRANULOCYTES NFR BLD AUTO: 2 % (ref 0–0.9)
KETONES UR STRIP.AUTO-MCNC: ABNORMAL MG/DL
LEUKOCYTE ESTERASE UR QL STRIP.AUTO: NEGATIVE
LYMPHOCYTES # BLD AUTO: 1.99 K/UL (ref 1–4.8)
LYMPHOCYTES NFR BLD: 11.7 % (ref 22–41)
MCH RBC QN AUTO: 29.8 PG (ref 27–33)
MCHC RBC AUTO-ENTMCNC: 32.9 G/DL (ref 33.6–35)
MCV RBC AUTO: 90.5 FL (ref 81.4–97.8)
MICRO URNS: ABNORMAL
MONOCYTES # BLD AUTO: 1.23 K/UL (ref 0–0.85)
MONOCYTES NFR BLD AUTO: 7.2 % (ref 0–13.4)
NEUTROPHILS # BLD AUTO: 12.85 K/UL (ref 2–7.15)
NEUTROPHILS NFR BLD: 75.7 % (ref 44–72)
NITRITE UR QL STRIP.AUTO: NEGATIVE
NRBC # BLD AUTO: 0 K/UL
NRBC BLD-RTO: 0 /100 WBC
PH UR STRIP.AUTO: 5.5 [PH]
PLATELET # BLD AUTO: 497 K/UL (ref 164–446)
PMV BLD AUTO: 10.8 FL (ref 9–12.9)
POTASSIUM SERPL-SCNC: 3.9 MMOL/L (ref 3.6–5.5)
PROT SERPL-MCNC: 6.8 G/DL (ref 6–8.2)
PROT UR QL STRIP: NEGATIVE MG/DL
RBC # BLD AUTO: 4.4 M/UL (ref 4.2–5.4)
RBC UR QL AUTO: NEGATIVE
SODIUM SERPL-SCNC: 136 MMOL/L (ref 135–145)
SP GR UR STRIP.AUTO: 1.04
UROBILINOGEN UR STRIP.AUTO-MCNC: 0.2 MG/DL
WBC # BLD AUTO: 17 K/UL (ref 4.8–10.8)

## 2018-03-19 PROCEDURE — 700111 HCHG RX REV CODE 636 W/ 250 OVERRIDE (IP): Performed by: HOSPITALIST

## 2018-03-19 PROCEDURE — 81003 URINALYSIS AUTO W/O SCOPE: CPT

## 2018-03-19 PROCEDURE — 71045 X-RAY EXAM CHEST 1 VIEW: CPT

## 2018-03-19 PROCEDURE — 36415 COLL VENOUS BLD VENIPUNCTURE: CPT

## 2018-03-19 PROCEDURE — 99232 SBSQ HOSP IP/OBS MODERATE 35: CPT | Performed by: INTERNAL MEDICINE

## 2018-03-19 PROCEDURE — A9270 NON-COVERED ITEM OR SERVICE: HCPCS | Performed by: HOSPITALIST

## 2018-03-19 PROCEDURE — 85025 COMPLETE CBC W/AUTO DIFF WBC: CPT

## 2018-03-19 PROCEDURE — 80053 COMPREHEN METABOLIC PANEL: CPT

## 2018-03-19 PROCEDURE — 87086 URINE CULTURE/COLONY COUNT: CPT

## 2018-03-19 PROCEDURE — 700102 HCHG RX REV CODE 250 W/ 637 OVERRIDE(OP): Performed by: HOSPITALIST

## 2018-03-19 PROCEDURE — 770006 HCHG ROOM/CARE - MED/SURG/GYN SEMI*

## 2018-03-19 PROCEDURE — 700105 HCHG RX REV CODE 258: Performed by: HOSPITALIST

## 2018-03-19 RX ADMIN — OXYCODONE HYDROCHLORIDE 5 MG: 5 TABLET ORAL at 03:22

## 2018-03-19 RX ADMIN — HEPARIN SODIUM 5000 UNITS: 5000 INJECTION, SOLUTION INTRAVENOUS; SUBCUTANEOUS at 05:09

## 2018-03-19 RX ADMIN — ONDANSETRON 4 MG: 4 TABLET, ORALLY DISINTEGRATING ORAL at 03:21

## 2018-03-19 RX ADMIN — HEPARIN SODIUM 5000 UNITS: 5000 INJECTION, SOLUTION INTRAVENOUS; SUBCUTANEOUS at 14:40

## 2018-03-19 RX ADMIN — PIPERACILLIN SODIUM AND TAZOBACTAM SODIUM 3.38 G: 3; .375 INJECTION, POWDER, FOR SOLUTION INTRAVENOUS at 05:09

## 2018-03-19 RX ADMIN — PIPERACILLIN SODIUM AND TAZOBACTAM SODIUM 3.38 G: 3; .375 INJECTION, POWDER, FOR SOLUTION INTRAVENOUS at 21:39

## 2018-03-19 RX ADMIN — PIPERACILLIN SODIUM AND TAZOBACTAM SODIUM 3.38 G: 3; .375 INJECTION, POWDER, FOR SOLUTION INTRAVENOUS at 14:40

## 2018-03-19 RX ADMIN — STANDARDIZED SENNA CONCENTRATE AND DOCUSATE SODIUM 2 TABLET: 8.6; 5 TABLET, FILM COATED ORAL at 21:39

## 2018-03-19 RX ADMIN — STANDARDIZED SENNA CONCENTRATE AND DOCUSATE SODIUM 2 TABLET: 8.6; 5 TABLET, FILM COATED ORAL at 07:53

## 2018-03-19 ASSESSMENT — ENCOUNTER SYMPTOMS
TINGLING: 0
NAUSEA: 0
INSOMNIA: 0
PALPITATIONS: 0
CHILLS: 0
DEPRESSION: 0
FEVER: 0
SHORTNESS OF BREATH: 0
VOMITING: 0
EYE PAIN: 0
BLURRED VISION: 0
DIZZINESS: 0
ABDOMINAL PAIN: 1
HEADACHES: 0
NECK PAIN: 0
COUGH: 0
BACK PAIN: 0
SORE THROAT: 0

## 2018-03-19 ASSESSMENT — PAIN SCALES - GENERAL
PAINLEVEL_OUTOF10: 6
PAINLEVEL_OUTOF10: 0

## 2018-03-19 NOTE — CARE PLAN
Problem: Infection  Goal: Will remain free from infection  Outcome: PROGRESSING AS EXPECTED  4 hour zosyn administration being monitored    Problem: Pain Management  Goal: Pain level will decrease to patient's comfort goal  Outcome: PROGRESSING AS EXPECTED  Pt only pain is HA, requesting tylenol

## 2018-03-19 NOTE — PROGRESS NOTES
Bedside report received.  Assessment complete.  A&O x 4. Patient calls appropriately.  Patient up with no assist. Bed alarm refused. Pt educated.   Patient denies pain.  Denies N&V. Tolerating regular low fat diet.  Surgical lap sites, CDI.  + void, + flatus  Patient denies SOB.  SCD's refused. Pt educated.\[id number]\  Review plan with of care with patient. Call light and personal belongings with in reach. Hourly rounding in place. All needs met at this time.

## 2018-03-19 NOTE — CARE PLAN
Problem: Safety  Goal: Will remain free from falls  Outcome: PROGRESSING AS EXPECTED  Pt remains free from fall at this time.  Pt educated on fall risk and demonstrates understanding by appropriate use of call light to call for assistance.  CLIP, hourly rounding in place    Problem: Venous Thromboembolism (VTW)/Deep Vein Thrombosis (DVT) Prevention:  Goal: Patient will participate in Venous Thrombosis (VTE)/Deep Vein Thrombosis (DVT)Prevention Measures  Outcome: PROGRESSING AS EXPECTED   03/18/18 2108   Mechanical/VTE Prophylaxis   Mechanical Prophylaxis  SCDs, Sequential Compression Device   SCDs, Sequential Compression Device Refused   OTHER   Risk Assessment Score 2   VTE RISK Moderate   Pharmacologic Prophylaxis Used Unfractionated Heparin  (Pt refused)

## 2018-03-19 NOTE — PROGRESS NOTES
Surgery    Clinically doing well  CT negative  WBC still 17, probably residual pancreatic inflammation but tolerating PO with no pain.    Recommend discharge today and I can follow her in my outpatient clinic.    Trenton Becker MD  General&Vascular Surgery  [facility]  Cell: [phone]  Office: [phone]

## 2018-03-19 NOTE — PROGRESS NOTES
[facility] Hospitalist Progress Note    Date of Service: 3/19/2018    Chief Complaint  27 y.o. female admitted 3/12/2018 with abodminal pain found to have gallstone pancreatitis.     Interval Problem Update   Pt seen No overnight events.   had ERCP done 3/14/18,had cholecystectomy done on 3/15/18.    Wbc still 17 today, CT abd showed some residual pancreatic inflammation,will also check a UA/Ucx and also CXR, Bcx also pending         Consultants/Specialty  GI: Dr. Ramirez  Surgery: Dr. Rosita Gar  TBD     Ultrasound of gallbladder shows cholelithiasis with dilated common bile duct findings may represent choledocholithiasis are recently passed common duct stone with residual common duct enlargement.        Review of Systems   Constitutional: Negative for chills and fever.   HENT: Negative for sore throat.    Eyes: Negative for blurred vision and pain.   Respiratory: Negative for cough and shortness of breath.    Cardiovascular: Negative for chest pain and palpitations.   Gastrointestinal: Positive for abdominal pain. Negative for nausea and vomiting.   Genitourinary: Negative for dysuria and urgency.   Musculoskeletal: Negative for back pain and neck pain.   Skin: Negative for itching and rash.   Neurological: Negative for dizziness, tingling and headaches.   Psychiatric/Behavioral: Negative for depression. The patient does not have insomnia.    All other systems reviewed and are negative.     Physical Exam  Laboratory/Imaging   Hemodynamics  Temp (24hrs), Av.2 °C (97.2 °F), Min:36.1 °C (96.9 °F), Max:36.5 °C (97.7 °F)   Temperature: 36.1 °C (96.9 °F)  Pulse  Av.7  Min: 60  Max: 126   Blood Pressure: (!) 91/62 (nurse notified)      Respiratory      Respiration: 16, Pulse Oximetry: 95 %             Fluids    Intake/Output Summary (Last 24 hours) at 18 1200  Last data filed at 18 0800   Gross per 24 hour   Intake              860 ml   Output                0 ml   Net              860 ml        Nutrition  Orders Placed This Encounter   Procedures   • DIET ORDER     Standing Status:   Standing     Number of Occurrences:   1     Order Specific Question:   Diet:     Answer:   Regular [1]     Order Specific Question:   Macronutrient modifications:     Answer:   Low Fat [5]     Physical Exam   Constitutional: She is oriented to person, place, and time. She appears well-developed and well-nourished. No distress.   HENT:   Right Ear: External ear normal.   Left Ear: External ear normal.   Nose: Nose normal.   Eyes: Conjunctivae are normal. Right eye exhibits no discharge. Left eye exhibits no discharge.   Neck: No JVD present.   Cardiovascular: Regular rhythm and normal heart sounds.    No murmur heard.  Cap refill 2sec  Pulses 2+ throughout  Normal skin  Color.    Pulmonary/Chest: Effort normal and breath sounds normal. No stridor. No respiratory distress. She has no wheezes. She has no rales.   Abdominal: Soft. Bowel sounds are normal. She exhibits no distension. There is tenderness.   Musculoskeletal: She exhibits no edema or tenderness.   Neurological: She is alert and oriented to person, place, and time.   Skin: Skin is warm and dry. She is not diaphoretic. No erythema.   Psychiatric: She has a normal mood and affect. Her behavior is normal.   Nursing note and vitals reviewed.      Recent Labs      03/17/18 0223 03/18/18 0043  03/19/18   0434   WBC  15.5*  17.4*  17.0*   RBC  3.87*  4.08*  4.40   HEMOGLOBIN  11.5*  12.1  13.1   HEMATOCRIT  35.6*  37.9  39.8   MCV  92.0  92.9  90.5   MCH  29.7  29.7  29.8   MCHC  32.3*  31.9*  32.9*   RDW  42.9  43.4  42.2   PLATELETCT  380  423  497*   MPV  11.2  10.8  10.8     Recent Labs      03/17/18 0223 03/18/18   0043  03/19/18   0434   SODIUM  135  133*  136   POTASSIUM  4.1  4.2  3.9   CHLORIDE  101  101  101   CO2  26  23  24   GLUCOSE  92  103*  104*   BUN  8  8  10   CREATININE  0.48*  0.54  0.56   CALCIUM  8.9  9.5  9.5                       Assessment/Plan     * Gallstone pancreatitis   Assessment & Plan    IVF   Anti emetics  Pain control   Had ERCP done on 3/14/18 , gi following appreciate rec.   Had cholecystectomy done on 3/15/18 , surgery following appreciate rec.           Cholecystitis   Assessment & Plan    Surgery following, had cholecystectomy done on 3/15/18     Appreciate rec.    cont IV abx               Sepsis (CMS-[facility])   Assessment & Plan    This is sepsis (without associated acute organ dysfunction).   2/2 cholecystitis likely  Cont on IVF and IV abx             Leukocytosis   Assessment & Plan    Wbc still up on 17 today.   Cont on zosyn  CT abdomen showed some residual pancreatic inflammation  Bcx pending, will also check a UA/Ucx and CXr  Monitor         Choledocholithiasis   Assessment & Plan    Noted on outside ultrasound.   With common bile duct enlargement   MRCP also positive for choledocholithiasis   Had ERCP done 3/14/18           Quality-Core Measures   Reviewed items::  EKG reviewed, Radiology images reviewed, Labs reviewed and Medications reviewed  Leon catheter::  No Leon  DVT prophylaxis pharmacological::  Heparin  DVT prophylaxis - mechanical:  SCDs  Ulcer Prophylaxis::  Yes  Antibiotics:  Treating active infection/contamination beyond 24 hours perioperative coverage  Assessed for rehabilitation services:  Patient was assess for and/or received rehabilitation services during this hospitalization

## 2018-03-19 NOTE — PROGRESS NOTES
Assumed care of Ms Brandon Keating at 1900.    Pt is A&O x4.  Pain denied at this time  Nausea denied  Tolerating Diet   Abdominal lap sites, open to air, dermabond, clean dry intact  Positive Urine output  Positive BM Void   Positive Flatus  Up Self     ** No bed alarm indicated, No risk for fall     Bed in lowest position and locked.  Pt resting comfortably now.  Review plan of care with patient  Call light within reach  Hourly rounds in place  All needs met at this time

## 2018-03-20 VITALS
BODY MASS INDEX: 27.51 KG/M2 | DIASTOLIC BLOOD PRESSURE: 70 MMHG | TEMPERATURE: 97.7 F | OXYGEN SATURATION: 97 % | WEIGHT: 145.72 LBS | HEIGHT: 61 IN | RESPIRATION RATE: 18 BRPM | SYSTOLIC BLOOD PRESSURE: 100 MMHG | HEART RATE: 89 BPM

## 2018-03-20 LAB
ALBUMIN SERPL BCP-MCNC: 3.8 G/DL (ref 3.2–4.9)
ALBUMIN/GLOB SERPL: 1.2 G/DL
ALP SERPL-CCNC: 144 U/L (ref 30–99)
ALT SERPL-CCNC: 65 U/L (ref 2–50)
ANION GAP SERPL CALC-SCNC: 10 MMOL/L (ref 0–11.9)
AST SERPL-CCNC: 16 U/L (ref 12–45)
BILIRUB SERPL-MCNC: 0.7 MG/DL (ref 0.1–1.5)
BUN SERPL-MCNC: 11 MG/DL (ref 8–22)
CALCIUM SERPL-MCNC: 10 MG/DL (ref 8.5–10.5)
CHLORIDE SERPL-SCNC: 99 MMOL/L (ref 96–112)
CO2 SERPL-SCNC: 25 MMOL/L (ref 20–33)
CREAT SERPL-MCNC: 0.7 MG/DL (ref 0.5–1.4)
ERYTHROCYTE [DISTWIDTH] IN BLOOD BY AUTOMATED COUNT: 41.7 FL (ref 35.9–50)
GLOBULIN SER CALC-MCNC: 3.3 G/DL (ref 1.9–3.5)
GLUCOSE SERPL-MCNC: 95 MG/DL (ref 65–99)
HCT VFR BLD AUTO: 43.1 % (ref 37–47)
HGB BLD-MCNC: 13.7 G/DL (ref 12–16)
MCH RBC QN AUTO: 29.1 PG (ref 27–33)
MCHC RBC AUTO-ENTMCNC: 31.8 G/DL (ref 33.6–35)
MCV RBC AUTO: 91.5 FL (ref 81.4–97.8)
PLATELET # BLD AUTO: 572 K/UL (ref 164–446)
PMV BLD AUTO: 10.9 FL (ref 9–12.9)
POTASSIUM SERPL-SCNC: 4.3 MMOL/L (ref 3.6–5.5)
PROT SERPL-MCNC: 7.1 G/DL (ref 6–8.2)
RBC # BLD AUTO: 4.71 M/UL (ref 4.2–5.4)
SODIUM SERPL-SCNC: 134 MMOL/L (ref 135–145)
WBC # BLD AUTO: 12.9 K/UL (ref 4.8–10.8)

## 2018-03-20 PROCEDURE — 80053 COMPREHEN METABOLIC PANEL: CPT

## 2018-03-20 PROCEDURE — 36415 COLL VENOUS BLD VENIPUNCTURE: CPT

## 2018-03-20 PROCEDURE — 85027 COMPLETE CBC AUTOMATED: CPT

## 2018-03-20 PROCEDURE — 700105 HCHG RX REV CODE 258: Performed by: HOSPITALIST

## 2018-03-20 PROCEDURE — 700111 HCHG RX REV CODE 636 W/ 250 OVERRIDE (IP): Performed by: HOSPITALIST

## 2018-03-20 PROCEDURE — 99239 HOSP IP/OBS DSCHRG MGMT >30: CPT | Performed by: FAMILY MEDICINE

## 2018-03-20 RX ADMIN — PIPERACILLIN SODIUM AND TAZOBACTAM SODIUM 3.38 G: 3; .375 INJECTION, POWDER, FOR SOLUTION INTRAVENOUS at 04:56

## 2018-03-20 ASSESSMENT — PAIN SCALES - GENERAL: PAINLEVEL_OUTOF10: 0

## 2018-03-20 NOTE — DISCHARGE SUMMARY
Hospital Medicine Discharge Note     Admit Date:  3/12/2018       Discharge Date:   3/20/2018    Attending Physician:  Jose Luis Almanza     Diagnoses (includes active and resolved):   Principal Problem:    Gallstone pancreatitis POA: Unknown  Active Problems:    Sepsis (CMS-HCC) POA: Unknown    Cholecystitis POA: Unknown    Choledocholithiasis POA: Unknown    Postoperative pain POA: Unknown    Leukocytosis POA: Unknown  Resolved Problems:    RUQ pain POA: Unknown      Hospital Summary (Brief Narrative):       Patient was abated for gallstone pancreatitis, cholecystitis, choledocholithiasis. Patient was placed on and reactive form of IV Zosyn. She finished a full course here. Surgery was consulted as well as gastroenterology. Patient underwent ERCP with stone extraction. She didn't underwent laparoscopic cholecystectomy. Postoperative she has done well however her white count continued to remain elevated. Repeat CT scan only showed resolving pancreatitis. Both GI and surgery have cleared her for discharge. Her white count as well as LFTs have trended down.         Consultants:      Gastroenterology - [facility]  Surgery [facility]    Procedures:        Endoscopic retrograde cholangiography with sphincterotomy and stone extraction    Laparoscopic cholecystectomy    Discharge Medications:        Medication Reconciliation Completed     Medication List      START taking these medications      Instructions   docusate sodium 100 MG Caps  Commonly known as:  COLACE   Doctor's comments:  Take this as long as you are taking pain medication. Stop taking this if you have loose stool  Take 1 Cap by mouth 2 times a day.  Dose:  100 mg     ondansetron 4 MG Tabs tablet  Commonly known as:  ZOFRAN   Take 1 Tab by mouth every four hours as needed for Nausea/Vomiting.  Dose:  4 mg        ASK your doctor about these medications      Instructions   HYDROcodone-acetaminophen 5-325 MG Tabs per tablet  Commonly known as:  NORCO  Ask about:  Should I take this medication?   Doctor's comments:  No driving while on pain medications  Take 1-2 Tabs by mouth every four hours as needed (as needed for pain) for up to 4 days.  Dose:  1-2 Tab              Disposition:  Home    Diet:   Regular    Activity:   As tolerated    Code status:  Full    Primary Care Provider:    No primary care provider on file.    Follow up appointment details :        No follow-up provider specified.  Follow up with Surgery - [facility] in 1 week    Pending Studies:        None    Time spent on discharge day patient visit: 35 minutes    #################################################      Most Recent Labs:    Lab Results   Component Value Date/Time    WBC 12.9 (H) 03/20/2018 02:41 AM    RBC 4.71 03/20/2018 02:41 AM    HEMOGLOBIN 13.7 03/20/2018 02:41 AM    HEMATOCRIT 43.1 03/20/2018 02:41 AM    MCV 91.5 03/20/2018 02:41 AM    MCH 29.1 03/20/2018 02:41 AM    MCHC 31.8 (L) 03/20/2018 02:41 AM    MPV 10.9 03/20/2018 02:41 AM    NEUTSPOLYS 75.70 (H) 03/19/2018 04:34 AM    LYMPHOCYTES 11.70 (L) 03/19/2018 04:34 AM    MONOCYTES 7.20 03/19/2018 04:34 AM    EOSINOPHILS 3.00 03/19/2018 04:34 AM    BASOPHILS 0.40 03/19/2018 04:34 AM      Lab Results   Component Value Date/Time    SODIUM 134 (L) 03/20/2018 02:41 AM    POTASSIUM 4.3 03/20/2018 02:41 AM    CHLORIDE 99 03/20/2018 02:41 AM    CO2 25 03/20/2018 02:41 AM    GLUCOSE 95 03/20/2018 02:41 AM    BUN 11 03/20/2018 02:41 AM    CREATININE 0.70 03/20/2018 02:41 AM      Lab Results   Component Value Date/Time    ALTSGPT 65 (H) 03/20/2018 02:41 AM    ASTSGOT 16 03/20/2018 02:41 AM    ALKPHOSPHAT 144 (H) 03/20/2018 02:41 AM    TBILIRUBIN 0.7 03/20/2018 02:41 AM    LIPASE 2944 (H) 03/13/2018 04:30 AM    ALBUMIN 3.8 03/20/2018 02:41 AM    GLOBULIN 3.3 03/20/2018 02:41 AM    INR 1.04 03/12/2018 02:21 AM     Lab Results   Component Value Date/Time    PROTHROMBTM 13.3 03/12/2018 02:21 AM    INR 1.04 03/12/2018 02:21 AM        Imaging/ Testing:       DX-CHEST-PORTABLE (1 VIEW)   Final Result      Small left pleural effusion with associated basilar atelectasis and/or consolidation.      CT-ABDOMEN-PELVIS WITH   Final Result      1.  There has been interval cholecystectomy. There is no biliary dilatation. There is no significant fluid in the gallbladder fossa.   2.  There is some residual peripancreatic fat stranding consistent with continued but improved pancreatitis. There is no focal peripancreatic fluid collection.   3.  There are trace amounts of dependent pleural effusion with slight increase in bibasilar airspace disease, likely due to atelectasis.   4.  There is a trace of pericardial fluid.      GL-SJRS-NEDLZIY STONE REMOVAL   Final Result      1.  ERCP examination demonstrating successful removal of multiple common bile duct stones.      2.  Cholelithiasis.      FU-CADCCSU-L/O   Final Result      Choledocholithiasis with at least two 3 mm stones visualized      Mild worsening 1 cm extrahepatic biliary ductal dilatation      Diffuse pancreatic enlargement and edema is compatible with acute pancreatitis. There is  stable small anterior pararenal space, perisplenic and paracolic gutter fluid      Cholelithiasis with mild gallbladder wall thickening that may indicate acute cholecystitis      CT-ABDOMEN-PELVIS WITH   Final Result      1.  Findings consistent with acute pancreatitis. Peripancreatic inflammatory changes and fluid collections extending into the retroperitoneal soft tissues demonstrated. No evidence of necrosis or pseudocyst.   2.  Distended gallbladder and dilatation of the biliary tree. Possible distal common duct stone or obstructing lesion.   3.  Small free fluid within the abdomen and pelvis.   Findings were discussed with ASAEL FLORES on 3/12/2018 11:58 AM.      OUTSIDE IMAGES-US ABDOMEN   Final Result          Instructions:      The patient was instructed to return to the ER in the event of worsening symptoms. I have counseled the  patient on the importance of compliance and the patient has agreed to proceed with all medical recommendations and follow up plan indicated above.   The patient understands that all medications come with benefits and risks. Risks may include permanent injury or death and these risks can be minimized with close reassessment and monitoring.

## 2018-03-20 NOTE — PROGRESS NOTES
Bedside report received.  Assessment complete.  A&O x 4. Patient calls appropriately.  Patient up with no assist. Bed alarm NI. Pt educated   Patient denies pain.  Denies N&V. Tolerating regular diet.  Surgical lap sites to abd healing.  + void, + flatus  Patient denies SOB.  SCD's refused. Pt educated.  Patient anticipated to DC today. WBC trending down.   Review plan with of care with patient. Call light and personal belongings with in reach. Hourly rounding in place. All needs met at this time.

## 2018-03-20 NOTE — CARE PLAN
Problem: Safety  Goal: Will remain free from falls  Outcome: PROGRESSING AS EXPECTED  Pt remains free from fall at this time.  Pt educated on fall risk and demonstrates understanding by appropriate use of call light to call for assistance.  CLIP, hourly rounding in place    Problem: Venous Thromboembolism (VTW)/Deep Vein Thrombosis (DVT) Prevention:  Goal: Patient will participate in Venous Thrombosis (VTE)/Deep Vein Thrombosis (DVT)Prevention Measures   03/19/18 2139   Mechanical/VTE Prophylaxis   Mechanical Prophylaxis  SCDs, Sequential Compression Device   SCDs, Sequential Compression Device Refused   OTHER   Risk Assessment Score 2   VTE RISK Moderate   Pharmacologic Prophylaxis Used Unfractionated Heparin

## 2018-03-20 NOTE — PROGRESS NOTES
Surgery    Clinically doing well  CT negative  WBC down to 12.9  Tolerating PO with no pain.    Recommend discharge today and I can follow her in my outpatient clinic.  Discharge instructions and prescriptions printed and in the chart. Any questions or concerns please text or call me directly at [phone] or try my office at [phone]. Thanks, Dr. Becker (General&Vascular Surgery)      Trenton Becker MD  General&Vascular Surgery  [facility]  Cell: [phone]  Office: [phone]

## 2018-03-20 NOTE — DISCHARGE INSTRUCTIONS
Discharge Instructions    Discharged to home by car with relative. Discharged via wheelchair, hospital escort: Yes.  Special equipment needed: Not Applicable    Be sure to schedule a follow-up appointment with your primary care doctor or any specialists as instructed.     Discharge Plan:   Influenza Vaccine Indication: Patient Refuses    I understand that a diet low in cholesterol, fat, and sodium is recommended for good health. Unless I have been given specific instructions below for another diet, I accept this instruction as my diet prescription.   Other diet: regular    Special Instructions: None    · Is patient discharged on Warfarin / Coumadin?   No     Depression / Suicide Risk    As you are discharged from this [facility] facility, it is important to learn how to keep safe from harming yourself.    Recognize the warning signs:  · Abrupt changes in personality, positive or negative- including increase in energy   · Giving away possessions  · Change in eating patterns- significant weight changes-  positive or negative  · Change in sleeping patterns- unable to sleep or sleeping all the time   · Unwillingness or inability to communicate  · Depression  · Unusual sadness, discouragement and loneliness  · Talk of wanting to die  · Neglect of personal appearance   · Rebelliousness- reckless behavior  · Withdrawal from people/activities they love  · Confusion- inability to concentrate     If you or a loved one observes any of these behaviors or has concerns about self-harm, here's what you can do:  · Talk about it- your feelings and reasons for harming yourself  · Remove any means that you might use to hurt yourself (examples: pills, rope, extension cords, firearm)  · Get professional help from the community (Mental Health, Substance Abuse, psychological counseling)  · Do not be alone:Call your Safe Contact- someone whom you trust who will be there for you.  · Call your local CRISIS HOTLINE 151-4234 or  945.393.8762  · Call your local Children's Mobile Crisis Response Team [place] (678) 874-4437 or www.[employer]  · Call the toll free National Suicide Prevention Hotlines   · National Suicide Prevention Lifeline 185-396-SWMO (0860)  · Learnerator Line Network 800-SUICIDE (896-9180)    Laparoscopic Cholecystectomy  Laparoscopic cholecystectomy is surgery to remove the gallbladder. The gallbladder is a pear-shaped organ that lies beneath the liver on the right side of the body. The gallbladder stores bile, which is a fluid that helps the body to digest fats. Cholecystectomy is often done for inflammation of the gallbladder (cholecystitis). This condition is usually caused by a buildup of gallstones (cholelithiasis) in the gallbladder. Gallstones can block the flow of bile, which can result in inflammation and pain. In severe cases, emergency surgery may be required.  This procedure is done though small incisions in your abdomen (laparoscopic surgery). A thin scope with a camera (laparoscope) is inserted through one incision. Thin surgical instruments are inserted through the other incisions. In some cases, a laparoscopic procedure may be turned into a type of surgery that is done through a larger incision (open surgery).  Tell a health care provider about:  · Any allergies you have.  · All medicines you are taking, including vitamins, herbs, eye drops, creams, and over-the-counter medicines.  · Any problems you or family members have had with anesthetic medicines.  · Any blood disorders you have.  · Any surgeries you have had.  · Any medical conditions you have.  · Whether you are pregnant or may be pregnant.  What are the risks?  Generally, this is a safe procedure. However, problems may occur, including:  · Infection.  · Bleeding.  · Allergic reactions to medicines.  · Damage to other structures or organs.  · A stone remaining in the common bile duct. The common bile duct carries bile from the  gallbladder into the small intestine.  · A bile leak from the cyst duct that is clipped when your gallbladder is removed.  What happens before the procedure?  Staying hydrated   Follow instructions from your health care provider about hydration, which may include:  · Up to 2 hours before the procedure - you may continue to drink clear liquids, such as water, clear fruit juice, black coffee, and plain tea.  Eating and drinking restrictions   Follow instructions from your health care provider about eating and drinking, which may include:  · 8 hours before the procedure - stop eating heavy meals or foods such as meat, fried foods, or fatty foods.  · 6 hours before the procedure - stop eating light meals or foods, such as toast or cereal.  · 6 hours before the procedure - stop drinking milk or drinks that contain milk.  · 2 hours before the procedure - stop drinking clear liquids.  Medicines  · Ask your health care provider about:  ¨ Changing or stopping your regular medicines. This is especially important if you are taking diabetes medicines or blood thinners.  ¨ Taking medicines such as aspirin and ibuprofen. These medicines can thin your blood. Do not take these medicines before your procedure if your health care provider instructs you not to.  · You may be given antibiotic medicine to help prevent infection.  General instructions  · Let your health care provider know if you develop a cold or an infection before surgery.  · Plan to have someone take you home from the hospital or clinic.  · Ask your health care provider how your surgical site will be marked or identified.  What happens during the procedure?  · To reduce your risk of infection:  ¨ Your health care team will wash or sanitize their hands.  ¨ Your skin will be washed with soap.  ¨ Hair may be removed from the surgical area.  · An IV tube may be inserted into one of your veins.  · You will be given one or more of the following:  ¨ A medicine to help you  relax (sedative).  ¨ A medicine to make you fall asleep (general anesthetic).  · A breathing tube will be placed in your mouth.  · Your surgeon will make several small cuts (incisions) in your abdomen.  · The laparoscope will be inserted through one of the small incisions. The camera on the laparoscope will send images to a TV screen (monitor) in the operating room. This lets your surgeon see inside your abdomen.  · Air-like gas will be pumped into your abdomen. This will expand your abdomen to give the surgeon more room to perform the surgery.  · Other tools that are needed for the procedure will be inserted through the other incisions. The gallbladder will be removed through one of the incisions.  · Your common bile duct may be examined. If stones are found in the common bile duct, they may be removed.  · After your gallbladder has been removed, the incisions will be closed with stitches (sutures), staples, or skin glue.  · Your incisions may be covered with a bandage (dressing).  The procedure may vary among health care providers and hospitals.  What happens after the procedure?  · Your blood pressure, heart rate, breathing rate, and blood oxygen level will be monitored until the medicines you were given have worn off.  · You will be given medicines as needed to control your pain.  · Do not drive for 24 hours if you were given a sedative.  This information is not intended to replace advice given to you by your health care provider. Make sure you discuss any questions you have with your health care provider.  Document Released: 12/18/2006 Document Revised: 07/09/2017 Document Reviewed: 06/05/2017  [employer] Interactive Patient Education © 2017 [employer]

## 2018-03-20 NOTE — PROGRESS NOTES
Pt discharged with family friend and CNA in [facility]. Prescriptions were given x 3. Pt was educated on need to fill prescription in [place]. Pt lives in [place] . D/C instructions signed and placed in chart. Chart given to [facility]. Charge notified. Medications from pts drawer returned to pharmacy. Controlled substance use informed consent signed.     Pt was educated on Dr. Becker post operation directions and need for follow up appointment. Physicians office number provided.

## 2018-03-21 LAB
BACTERIA UR CULT: NORMAL
SIGNIFICANT IND 70042: NORMAL
SITE SITE: NORMAL
SOURCE SOURCE: NORMAL

## 2018-03-23 LAB
BACTERIA BLD CULT: NORMAL
BACTERIA BLD CULT: NORMAL
SIGNIFICANT IND 70042: NORMAL
SIGNIFICANT IND 70042: NORMAL
SITE SITE: NORMAL
SITE SITE: NORMAL
SOURCE SOURCE: NORMAL
SOURCE SOURCE: NORMAL

## 2021-05-13 NOTE — OP REPORT
DATE OF SERVICE:  3/13/2018     PROCEDURE PERFORMED:  Endoscopic retrograde cholangiography with sphincterotomy and stone extraction     CONSENT:  Procedure risks and benefits reviewed thoroughly with the patient, risks including but not limited to bleeding, perforation, side effects of medication were informed.  Patient voiced understanding and agreed to proceed.    Additional risks inherent to ERCP that being mild, moderate, severe pancreatitis that could lead to postprocedural pain, prolonged hospitalization, intensive care unit stay, and/or death were reviewed with the patient who voiced understanding and agreed to proceed.     PREPROCEDURE DIAGNOSIS:  choledocholithiasis     POSTPROCEDURE DIAGNOSES:  choledocholithiasis     PHYSICIAN: Lawrence Ramirez MD        DESCRIPTION OF PROCEDURE:  Patient was placed in a prone position after intubation and sedation.  A bite block was inserted in the mouth and a side-viewing duodenoscope was passed carefully and easily under indirect vision into the esophagus past the second portion of duodenum brought in a   shortened position.  The ampulla was identified.  It appeared normal.      A [employer] Rx44 sphincterotome with a 0.025 wire was utilized to cannulate the ampulla. Initial wire placement lead to wire cannulation of the Pd. No contrast was injected. With repostioning of the scope and sphincterotome, the CBD was cannulated. The wire was advanced to the level of the intrahepatics. The sphincterotome was then inserted over the wire and bile was aspirated. Contrast was then injected. Cholangiogram revealed a non-dilated CBD measuring 6mm. There appeared to be some small filling defects in the distal CBD. The GB filled slightly with contrast and there were multiple filling defects in the GB lumen consistent with cholelithiasis. The sphincterotome was then withdrawn to the level of the papilla and an adequate sphincterotomy was performed. The sphincterotome was then  removed and a 9-12mm biliary extraction balloon was inserted over the wire. Multiple balloon sweeps resulted in extraction of stone fragments from the duct. No large stones were seen or extracted. Occlusion cholangiogram revealed a patent CBD with no residual filling defects. All instruments were then removed and the procedure completed    COMPLICATIONS:  None.     BLOOD LOSS:  None.     SPECIMENS:  None.     RECOMMENDATIONS:   1) continue IV hydration via LR  2) NPO for now until improved abdominal pain; OK for sips and chips as tolerated  3) monitor post-procedural pain  4) cholecystectomy per surgery   Substance abuse

## 2021-12-30 NOTE — PROGRESS NOTES
Report received.  Assumed Care.  Pt in bed, 407 1. AOx4, responds appropriately.  Denies pain, SOB.  Plan of care discussed.  Explained importance of calling before getting OOB and pt verbalizes understanding.  Call light and belongings within reach, treaded slipper socks on, bed alarm in use, bed in lowest position.  Will monitor frequently.   3

## (undated) DEVICE — GLOVE BIOGEL SZ 7 SURGICAL PF LTX - (50PR/BX 4BX/CA)

## (undated) DEVICE — SET EXTENSION WITH 2 PORTS (48EA/CA) ***PART #2C8610 IS A SUBSTITUTE*****

## (undated) DEVICE — CHLORAPREP 26 ML APPLICATOR - ORANGE TINT(25/CA)

## (undated) DEVICE — GOWN SURGEONS X-LARGE - DISP. (30/CA)

## (undated) DEVICE — BLADE SURGICAL CLIPPER - (50EA/CA)

## (undated) DEVICE — PACK LAP CHOLE OR - (2EA/CA)

## (undated) DEVICE — DERMABOND ADVANCED - (12EA/BX)

## (undated) DEVICE — CANISTER SUCTION 3000ML MECHANICAL FILTER AUTO SHUTOFF MEDI-VAC NONSTERILE LF DISP  (40EA/CA)

## (undated) DEVICE — BASIN EMESIS DISP. - (250/CA)

## (undated) DEVICE — TUBING CLEARLINK DUO-VENT - C-FLO (48EA/CA)

## (undated) DEVICE — TROCAR 5X100 NON BLADED Z-TH - READ KII (6/BX)

## (undated) DEVICE — SUTURE 4-0 VICRYL PLUSFS-1 - 27 INCH (36/BX)

## (undated) DEVICE — ELECTRODE DUAL RETURN W/ CORD - (50/PK)

## (undated) DEVICE — DRAPESURG STERI-DRAPE LONG - (10/BX 4BX/CA)

## (undated) DEVICE — SUTURE GENERAL

## (undated) DEVICE — GLOVE BIOGEL SZ 8 SURGICAL PF LTX - (50PR/BX 4BX/CA)

## (undated) DEVICE — KIT ROOM DECONTAMINATION

## (undated) DEVICE — BALLOON RETRIEVAL EXTRACTOR PRO RX   9-12MM

## (undated) DEVICE — SET LEADWIRE 5 LEAD BEDSIDE DISPOSABLE ECG (1SET OF 5/EA)

## (undated) DEVICE — SENSOR SPO2 NEO LNCS ADHESIVE (20/BX) SEE USER NOTES

## (undated) DEVICE — KIT ANESTHESIA W/CIRCUIT & 3/LT BAG W/FILTER (20EA/CA)

## (undated) DEVICE — SUCTION INSTRUMENT YANKAUER BULBOUS TIP W/O VENT (50EA/CA)

## (undated) DEVICE — GLOVE SZ 7 BIOGEL PI MICRO - PF LF (50PR/BX 4BX/CA)

## (undated) DEVICE — BITE BLOCK ADULT 60FR (100EA/CA)

## (undated) DEVICE — TUBE CONNECT SUCTION CLEAR 120 X 1/4" (50EA/CA)"

## (undated) DEVICE — ELECTRODE 850 FOAM ADHESIVE - HYDROGEL RADIOTRNSPRNT (50/PK)

## (undated) DEVICE — TROCAR Z THREAD 11 X 100 - BLADED (6/BX)

## (undated) DEVICE — CANNULA W/SEAL 5X100 Z-THRE - ADED KII (12/BX)

## (undated) DEVICE — NEPTUNE 4 PORT MANIFOLD - (20/PK)

## (undated) DEVICE — ENDOLOOP 0 VICRYL - (12/BX)

## (undated) DEVICE — FILM CASSETTE ENDO

## (undated) DEVICE — GLOVE BIOGEL PI INDICATOR SZ 7.0 SURGICAL PF LF - (50/BX 4BX/CA)

## (undated) DEVICE — TUBING INSUFFLATION - (10/BX)

## (undated) DEVICE — PROTECTOR ULNA NERVE - (36PR/CA)

## (undated) DEVICE — GOWN WARMING STANDARD FLEX - (30/CA)

## (undated) DEVICE — SET SUCTION/IRRIGATION WITH DISPOSABLE TIP (6/CA )PART #0250-070-520 IS A SUB

## (undated) DEVICE — CLIP MED LG INTNL HRZN TI ESCP - (20/BX)

## (undated) DEVICE — SUTURE 0 VICRYL PLUS UR-6 - 27 INCH (36/BX)

## (undated) DEVICE — LACTATED RINGERS INJ 1000 ML - (14EA/CA 60CA/PF)

## (undated) DEVICE — MASK ANESTHESIA ADULT  - (100/CA)

## (undated) DEVICE — KIT CUSTOM PROCEDURE SINGLE FOR ENDO  (15/CA)

## (undated) DEVICE — HEAD HOLDER JUNIOR/ADULT

## (undated) DEVICE — SODIUM CHL IRRIGATION 0.9% 1000ML (12EA/CA)